# Patient Record
Sex: MALE | Race: WHITE | NOT HISPANIC OR LATINO | Employment: STUDENT | URBAN - METROPOLITAN AREA
[De-identification: names, ages, dates, MRNs, and addresses within clinical notes are randomized per-mention and may not be internally consistent; named-entity substitution may affect disease eponyms.]

---

## 2017-01-16 ENCOUNTER — OFFICE VISIT (OUTPATIENT)
Dept: INTERNAL MEDICINE | Facility: CLINIC | Age: 22
End: 2017-01-16
Payer: COMMERCIAL

## 2017-01-16 VITALS
WEIGHT: 196 LBS | HEART RATE: 96 BPM | SYSTOLIC BLOOD PRESSURE: 118 MMHG | TEMPERATURE: 97.9 F | BODY MASS INDEX: 28.12 KG/M2 | DIASTOLIC BLOOD PRESSURE: 68 MMHG | OXYGEN SATURATION: 97 %

## 2017-01-16 DIAGNOSIS — K21.9 GASTROESOPHAGEAL REFLUX DISEASE WITHOUT ESOPHAGITIS: ICD-10-CM

## 2017-01-16 DIAGNOSIS — F33.1 MODERATE EPISODE OF RECURRENT MAJOR DEPRESSIVE DISORDER (H): Primary | ICD-10-CM

## 2017-01-16 PROCEDURE — 99213 OFFICE O/P EST LOW 20 MIN: CPT | Performed by: INTERNAL MEDICINE

## 2017-01-16 RX ORDER — SERTRALINE HYDROCHLORIDE 100 MG/1
100 TABLET, FILM COATED ORAL DAILY
Qty: 90 TABLET | Refills: 1 | Status: SHIPPED | OUTPATIENT
Start: 2017-01-16 | End: 2017-07-27

## 2017-01-16 NOTE — PROGRESS NOTES
SUBJECTIVE:                                                    Reji Powell is a 21 year old male who presents to clinic today for the following health issues:      Depression F/U:    Has h/o depression. On medical treatment, better controlled, no side effects. No depressive symptoms or suicidal ideation.  Started Zoloft 1 month ago. Has increased from 25 to 50 mg, tolerated well. Has not see psychology. His stress is less. His dad has returned from ETOH rehab home. Able to talk to him.     Has h/o GERD on Omeprazole treatment. symptoms are controlled. No nausea, vomiting, heartburns, bloating.      Problem list and histories reviewed & adjusted, as indicated.  Additional history: none    Problem list, Medication list, Allergies, and Medical/Social/Surgical histories reviewed in EPIC and updated as appropriate.    ROS:  Constitutional, HEENT, cardiovascular, pulmonary, gi and gu systems are negative, except as otherwise noted.    OBJECTIVE:                                                    /68 mmHg  Pulse 96  Temp(Src) 97.9  F (36.6  C) (Oral)  Wt 196 lb (88.905 kg)  SpO2 97%  Body mass index is 28.12 kg/(m^2).  GENERAL: healthy, alert and no distress  NECK: no adenopathy, no asymmetry, masses, or scars and thyroid normal to palpation  RESP: lungs clear to auscultation - no rales, rhonchi or wheezes  CV: regular rate and rhythm, normal S1 S2, no S3 or S4, no murmur, click or rub, no peripheral edema and peripheral pulses strong  ABDOMEN: soft, nontender, no hepatosplenomegaly, no masses and bowel sounds normal  MS: no gross musculoskeletal defects noted, no edema    Diagnostic Test Results:  none      ASSESSMENT/PLAN:                                                      Problem List Items Addressed This Visit     Gastroesophageal reflux disease without esophagitis    Moderate episode of recurrent major depressive disorder (H) - Primary    Relevant Medications    sertraline (ZOLOFT) 100 MG tablet            Will titrate Zoloft to 75 mg for 2 weeks, then to 100 and plan on 6 months treatment   Cont PPI   Psychology evaluation advised     Follow-Up:in 6 months     Sincere Larson MD  [facility]

## 2017-01-16 NOTE — MR AVS SNAPSHOT
After Visit Summary   1/16/2017    Reji oPwell    MRN: [id number]           Patient Information     Date Of Birth          1995        Visit Information        Provider Department      1/16/2017 3:40 PM Sincere Larson MD [facility]        Today's Diagnoses     Moderate episode of recurrent major depressive disorder (H)    -  1     Gastroesophageal reflux disease without esophagitis            Follow-ups after your visit        Who to contact     If you have questions or need follow up information about today's clinic visit or your schedule please contact [facility] directly at [phone].  Normal or non-critical lab and imaging results will be communicated to you by MyChart, letter or phone within 4 business days after the clinic has received the results. If you do not hear from us within 7 days, please contact the clinic through MyChart or phone. If you have a critical or abnormal lab result, we will notify you by phone as soon as possible.  Submit refill requests through [employer] or call your pharmacy and they will forward the refill request to us. Please allow 3 business days for your refill to be completed.          Additional Information About Your Visit        Care EveryWhere ID     This is your Care EveryWhere ID. This could be used by other organizations to access your [place] medical records  [id number]        Your Vitals Were     Pulse Temperature Pulse Oximetry             96 97.9  F (36.6  C) (Oral) 97%          Blood Pressure from Last 3 Encounters:   01/16/17 118/68   12/12/16 120/76    Weight from Last 3 Encounters:   01/16/17 196 lb (88.905 kg)   12/12/16 203 lb (92.08 kg)              Today, you had the following     No orders found for display         Today's Medication Changes          These changes are accurate as of: 1/16/17  4:11 PM.  If you have any questions, ask your nurse or doctor.               These medicines have changed  or have updated prescriptions.        Dose/Directions    * sertraline 50 MG tablet   Commonly known as:  ZOLOFT   This may have changed:  Another medication with the same name was added. Make sure you understand how and when to take each.   Used for:  Moderate episode of recurrent major depressive disorder (H)   Changed by:  Sincere Larson MD        Take 1/2 tablet (25 mg) for 1-2 weeks, then increase to 1 tablet orally daily   Quantity:  30 tablet   Refills:  3       * sertraline 100 MG tablet   Commonly known as:  ZOLOFT   This may have changed:  You were already taking a medication with the same name, and this prescription was added. Make sure you understand how and when to take each.   Used for:  Moderate episode of recurrent major depressive disorder (H)   Changed by:  Sincere Larson MD        Dose:  100 mg   Take 1 tablet (100 mg) by mouth daily   Quantity:  90 tablet   Refills:  1       * Notice:  This list has 2 medication(s) that are the same as other medications prescribed for you. Read the directions carefully, and ask your doctor or other care provider to review them with you.         Where to get your medicines      These medications were sent to [facility]/pharmacy #[id number] - [place], [place] - [address]  [facility]  [address], [place] [place] [address]     Phone:  [phone]    - sertraline 100 MG tablet             Primary Care Provider    None Specified       No primary provider on file.        Thank you!     Thank you for choosing [facility]  for your care. Our goal is always to provide you with excellent care. Hearing back from our patients is one way we can continue to improve our services. Please take a few minutes to complete the written survey that you may receive in the mail after your visit with us. Thank you!             Your Updated Medication List - Protect others around you: Learn how to safely use, store and throw away your medicines at www.disposemymeds.org.           This list is accurate as of: 1/16/17  4:11 PM.  Always use your most recent med list.                   Brand Name Dispense Instructions for use    omeprazole 40 MG capsule    priLOSEC    30 capsule    Take 1 capsule (40 mg) by mouth daily Take 30-60 minutes before a meal.       * sertraline 50 MG tablet    ZOLOFT    30 tablet    Take 1/2 tablet (25 mg) for 1-2 weeks, then increase to 1 tablet orally daily       * sertraline 100 MG tablet    ZOLOFT    90 tablet    Take 1 tablet (100 mg) by mouth daily       ZANTAC 150 MG tablet   Generic drug:  ranitidine      Take by mouth as needed for heartburn       * Notice:  This list has 2 medication(s) that are the same as other medications prescribed for you. Read the directions carefully, and ask your doctor or other care provider to review them with you.

## 2017-01-16 NOTE — NURSING NOTE
"Chief Complaint   Patient presents with     Recheck Medication     Depression F/U ( improved)       Initial /68 mmHg  Pulse 96  Temp(Src) 97.9  F (36.6  C) (Oral)  Wt 196 lb (88.905 kg)  SpO2 97% Estimated body mass index is 28.12 kg/(m^2) as calculated from the following:    Height as of 12/12/16: 5' 10\" (1.778 m).    Weight as of this encounter: 196 lb (88.905 kg).  BP completed using cuff size: nicole Justin MA      "

## 2017-01-17 ASSESSMENT — PATIENT HEALTH QUESTIONNAIRE - PHQ9: SUM OF ALL RESPONSES TO PHQ QUESTIONS 1-9: 12

## 2017-03-08 DIAGNOSIS — K21.9 GASTROESOPHAGEAL REFLUX DISEASE WITHOUT ESOPHAGITIS: ICD-10-CM

## 2017-03-08 NOTE — TELEPHONE ENCOUNTER
omeprazole      Last Written Prescription Date: 12/12/16  Last Fill Quantity: 30,  # refills: 1   Last Office Visit with G, P or [facility] prescribing provider: 1/16/17

## 2017-03-13 RX ORDER — OMEPRAZOLE 40 MG/1
40 CAPSULE, DELAYED RELEASE ORAL DAILY
Qty: 90 CAPSULE | Refills: 2 | Status: SHIPPED | OUTPATIENT
Start: 2017-03-13 | End: 2022-04-14

## 2017-05-09 ENCOUNTER — TELEPHONE (OUTPATIENT)
Dept: INTERNAL MEDICINE | Facility: CLINIC | Age: 22
End: 2017-05-09

## 2017-05-10 ASSESSMENT — PATIENT HEALTH QUESTIONNAIRE - PHQ9: SUM OF ALL RESPONSES TO PHQ QUESTIONS 1-9: 12

## 2017-07-27 DIAGNOSIS — F33.1 MODERATE EPISODE OF RECURRENT MAJOR DEPRESSIVE DISORDER (H): ICD-10-CM

## 2017-07-28 NOTE — TELEPHONE ENCOUNTER
Sertraline     Last Written Prescription Date: 1/16/17  Last Fill Quantity: 90, # refills: 1  Last Office Visit with [facility] primary care provider:  1/16/17        Last PHQ-9 score on record=   PHQ-9 SCORE 5/9/2017   Total Score 12

## 2017-07-31 RX ORDER — SERTRALINE HYDROCHLORIDE 100 MG/1
TABLET, FILM COATED ORAL
Qty: 30 TABLET | Refills: 0 | Status: SHIPPED | OUTPATIENT
Start: 2017-07-31 | End: 2017-08-28

## 2017-08-28 DIAGNOSIS — F33.1 MODERATE EPISODE OF RECURRENT MAJOR DEPRESSIVE DISORDER (H): ICD-10-CM

## 2017-08-28 RX ORDER — SERTRALINE HYDROCHLORIDE 100 MG/1
100 TABLET, FILM COATED ORAL DAILY
Qty: 30 TABLET | Refills: 0 | Status: SHIPPED | OUTPATIENT
Start: 2017-08-28 | End: 2022-04-14

## 2017-08-28 NOTE — TELEPHONE ENCOUNTER
SERTRALINE     Last Written Prescription Date: 07/31/17  Last Fill Quantity: 30, # refills: 0  Last Office Visit with [facility] primary care provider:  01/16/17        Last PHQ-9 score on record=   PHQ-9 SCORE 5/9/2017   Total Score 12

## 2020-03-11 ENCOUNTER — COMMUNICATION - HEALTHEAST (OUTPATIENT)
Dept: TELEHEALTH | Facility: CLINIC | Age: 25
End: 2020-03-11

## 2020-03-11 ENCOUNTER — OFFICE VISIT - HEALTHEAST (OUTPATIENT)
Dept: FAMILY MEDICINE | Facility: CLINIC | Age: 25
End: 2020-03-11

## 2020-03-11 DIAGNOSIS — J45.20 MILD INTERMITTENT ASTHMA WITHOUT COMPLICATION: ICD-10-CM

## 2020-03-11 DIAGNOSIS — F33.1 MODERATE EPISODE OF RECURRENT MAJOR DEPRESSIVE DISORDER (H): ICD-10-CM

## 2020-03-11 DIAGNOSIS — Z00.00 ENCOUNTER FOR GENERAL ADULT MEDICAL EXAMINATION WITHOUT ABNORMAL FINDINGS: ICD-10-CM

## 2020-03-11 DIAGNOSIS — R14.2 BURPING: ICD-10-CM

## 2020-03-11 DIAGNOSIS — Z13.220 ENCOUNTER FOR SCREENING FOR LIPOID DISORDERS: ICD-10-CM

## 2020-03-11 DIAGNOSIS — Z76.89 ENCOUNTER TO ESTABLISH CARE: ICD-10-CM

## 2020-03-11 LAB
ANION GAP SERPL CALCULATED.3IONS-SCNC: 9 MMOL/L (ref 5–18)
BUN SERPL-MCNC: 13 MG/DL (ref 8–22)
CALCIUM SERPL-MCNC: 10 MG/DL (ref 8.5–10.5)
CHLORIDE BLD-SCNC: 105 MMOL/L (ref 98–107)
CHOLEST SERPL-MCNC: 255 MG/DL
CO2 SERPL-SCNC: 26 MMOL/L (ref 22–31)
CREAT SERPL-MCNC: 0.91 MG/DL (ref 0.7–1.3)
FASTING STATUS PATIENT QL REPORTED: YES
GFR SERPL CREATININE-BSD FRML MDRD: >60 ML/MIN/1.73M2
GLUCOSE BLD-MCNC: 90 MG/DL (ref 70–125)
HDLC SERPL-MCNC: 61 MG/DL
HGB BLD-MCNC: 17 G/DL (ref 14–18)
LDLC SERPL CALC-MCNC: 169 MG/DL
POTASSIUM BLD-SCNC: 4.4 MMOL/L (ref 3.5–5)
SODIUM SERPL-SCNC: 140 MMOL/L (ref 136–145)
TRIGL SERPL-MCNC: 125 MG/DL
TSH SERPL DL<=0.005 MIU/L-ACNC: 0.67 UIU/ML (ref 0.3–5)

## 2020-03-11 ASSESSMENT — ANXIETY QUESTIONNAIRES
7. FEELING AFRAID AS IF SOMETHING AWFUL MIGHT HAPPEN: MORE THAN HALF THE DAYS
4. TROUBLE RELAXING: MORE THAN HALF THE DAYS
IF YOU CHECKED OFF ANY PROBLEMS ON THIS QUESTIONNAIRE, HOW DIFFICULT HAVE THESE PROBLEMS MADE IT FOR YOU TO DO YOUR WORK, TAKE CARE OF THINGS AT HOME, OR GET ALONG WITH OTHER PEOPLE: SOMEWHAT DIFFICULT
5. BEING SO RESTLESS THAT IT IS HARD TO SIT STILL: NEARLY EVERY DAY
3. WORRYING TOO MUCH ABOUT DIFFERENT THINGS: MORE THAN HALF THE DAYS
6. BECOMING EASILY ANNOYED OR IRRITABLE: NEARLY EVERY DAY
GAD7 TOTAL SCORE: 16
1. FEELING NERVOUS, ANXIOUS, OR ON EDGE: SEVERAL DAYS
2. NOT BEING ABLE TO STOP OR CONTROL WORRYING: NEARLY EVERY DAY

## 2020-03-11 ASSESSMENT — PATIENT HEALTH QUESTIONNAIRE - PHQ9: SUM OF ALL RESPONSES TO PHQ QUESTIONS 1-9: 18

## 2020-03-11 ASSESSMENT — MIFFLIN-ST. JEOR: SCORE: 2009.58

## 2020-03-12 ENCOUNTER — COMMUNICATION - HEALTHEAST (OUTPATIENT)
Dept: FAMILY MEDICINE | Facility: CLINIC | Age: 25
End: 2020-03-12

## 2020-04-08 ENCOUNTER — OFFICE VISIT - HEALTHEAST (OUTPATIENT)
Dept: FAMILY MEDICINE | Facility: CLINIC | Age: 25
End: 2020-04-08

## 2020-04-08 DIAGNOSIS — F33.1 MODERATE EPISODE OF RECURRENT MAJOR DEPRESSIVE DISORDER (H): ICD-10-CM

## 2020-04-08 ASSESSMENT — PATIENT HEALTH QUESTIONNAIRE - PHQ9: SUM OF ALL RESPONSES TO PHQ QUESTIONS 1-9: 12

## 2020-04-10 ENCOUNTER — COMMUNICATION - HEALTHEAST (OUTPATIENT)
Dept: BEHAVIORAL HEALTH | Facility: CLINIC | Age: 25
End: 2020-04-10

## 2020-04-10 ENCOUNTER — OFFICE VISIT - HEALTHEAST (OUTPATIENT)
Dept: BEHAVIORAL HEALTH | Facility: CLINIC | Age: 25
End: 2020-04-10

## 2020-04-10 DIAGNOSIS — F33.1 MAJOR DEPRESSIVE DISORDER, RECURRENT, MODERATE (H): ICD-10-CM

## 2020-04-10 DIAGNOSIS — F41.1 GAD (GENERALIZED ANXIETY DISORDER): ICD-10-CM

## 2020-04-13 ASSESSMENT — ANXIETY QUESTIONNAIRES
3. WORRYING TOO MUCH ABOUT DIFFERENT THINGS: MORE THAN HALF THE DAYS
4. TROUBLE RELAXING: NEARLY EVERY DAY
1. FEELING NERVOUS, ANXIOUS, OR ON EDGE: NEARLY EVERY DAY
6. BECOMING EASILY ANNOYED OR IRRITABLE: NEARLY EVERY DAY
7. FEELING AFRAID AS IF SOMETHING AWFUL MIGHT HAPPEN: MORE THAN HALF THE DAYS
GAD7 TOTAL SCORE: 18
5. BEING SO RESTLESS THAT IT IS HARD TO SIT STILL: NEARLY EVERY DAY
2. NOT BEING ABLE TO STOP OR CONTROL WORRYING: MORE THAN HALF THE DAYS

## 2020-04-13 ASSESSMENT — PATIENT HEALTH QUESTIONNAIRE - PHQ9: SUM OF ALL RESPONSES TO PHQ QUESTIONS 1-9: 14

## 2020-04-16 ENCOUNTER — OFFICE VISIT - HEALTHEAST (OUTPATIENT)
Dept: BEHAVIORAL HEALTH | Facility: CLINIC | Age: 25
End: 2020-04-16

## 2020-04-16 DIAGNOSIS — F33.1 MAJOR DEPRESSIVE DISORDER, RECURRENT, MODERATE (H): ICD-10-CM

## 2020-04-16 DIAGNOSIS — F41.1 GAD (GENERALIZED ANXIETY DISORDER): ICD-10-CM

## 2020-04-23 ENCOUNTER — AMBULATORY - HEALTHEAST (OUTPATIENT)
Dept: BEHAVIORAL HEALTH | Facility: CLINIC | Age: 25
End: 2020-04-23

## 2020-04-23 ENCOUNTER — OFFICE VISIT - HEALTHEAST (OUTPATIENT)
Dept: BEHAVIORAL HEALTH | Facility: CLINIC | Age: 25
End: 2020-04-23

## 2020-04-23 DIAGNOSIS — F41.1 GAD (GENERALIZED ANXIETY DISORDER): ICD-10-CM

## 2020-04-23 DIAGNOSIS — F33.1 MAJOR DEPRESSIVE DISORDER, RECURRENT, MODERATE (H): ICD-10-CM

## 2020-04-30 ENCOUNTER — OFFICE VISIT - HEALTHEAST (OUTPATIENT)
Dept: BEHAVIORAL HEALTH | Facility: CLINIC | Age: 25
End: 2020-04-30

## 2020-04-30 DIAGNOSIS — F41.1 GAD (GENERALIZED ANXIETY DISORDER): ICD-10-CM

## 2020-04-30 DIAGNOSIS — F33.1 MAJOR DEPRESSIVE DISORDER, RECURRENT, MODERATE (H): ICD-10-CM

## 2020-05-07 ENCOUNTER — OFFICE VISIT - HEALTHEAST (OUTPATIENT)
Dept: BEHAVIORAL HEALTH | Facility: CLINIC | Age: 25
End: 2020-05-07

## 2020-05-07 DIAGNOSIS — F33.1 MAJOR DEPRESSIVE DISORDER, RECURRENT, MODERATE (H): ICD-10-CM

## 2020-05-07 DIAGNOSIS — F41.1 GAD (GENERALIZED ANXIETY DISORDER): ICD-10-CM

## 2020-05-19 ENCOUNTER — OFFICE VISIT - HEALTHEAST (OUTPATIENT)
Dept: BEHAVIORAL HEALTH | Facility: CLINIC | Age: 25
End: 2020-05-19

## 2020-05-19 DIAGNOSIS — F41.1 GAD (GENERALIZED ANXIETY DISORDER): ICD-10-CM

## 2020-05-19 DIAGNOSIS — F33.1 MAJOR DEPRESSIVE DISORDER, RECURRENT, MODERATE (H): ICD-10-CM

## 2020-05-26 ENCOUNTER — OFFICE VISIT - HEALTHEAST (OUTPATIENT)
Dept: BEHAVIORAL HEALTH | Facility: CLINIC | Age: 25
End: 2020-05-26

## 2020-05-26 DIAGNOSIS — F33.1 MAJOR DEPRESSIVE DISORDER, RECURRENT, MODERATE (H): ICD-10-CM

## 2020-05-26 DIAGNOSIS — F41.1 GAD (GENERALIZED ANXIETY DISORDER): ICD-10-CM

## 2020-06-09 ENCOUNTER — OFFICE VISIT - HEALTHEAST (OUTPATIENT)
Dept: BEHAVIORAL HEALTH | Facility: CLINIC | Age: 25
End: 2020-06-09

## 2020-06-09 DIAGNOSIS — F33.1 MAJOR DEPRESSIVE DISORDER, RECURRENT, MODERATE (H): ICD-10-CM

## 2020-06-09 DIAGNOSIS — F41.1 GAD (GENERALIZED ANXIETY DISORDER): ICD-10-CM

## 2020-06-15 ENCOUNTER — COMMUNICATION - HEALTHEAST (OUTPATIENT)
Dept: FAMILY MEDICINE | Facility: CLINIC | Age: 25
End: 2020-06-15

## 2020-06-15 DIAGNOSIS — F33.1 MODERATE EPISODE OF RECURRENT MAJOR DEPRESSIVE DISORDER (H): ICD-10-CM

## 2020-06-16 ENCOUNTER — OFFICE VISIT - HEALTHEAST (OUTPATIENT)
Dept: BEHAVIORAL HEALTH | Facility: CLINIC | Age: 25
End: 2020-06-16

## 2020-06-16 DIAGNOSIS — F33.1 MAJOR DEPRESSIVE DISORDER, RECURRENT, MODERATE (H): ICD-10-CM

## 2020-06-16 DIAGNOSIS — F41.1 GAD (GENERALIZED ANXIETY DISORDER): ICD-10-CM

## 2020-06-23 ENCOUNTER — OFFICE VISIT - HEALTHEAST (OUTPATIENT)
Dept: BEHAVIORAL HEALTH | Facility: CLINIC | Age: 25
End: 2020-06-23

## 2020-06-23 DIAGNOSIS — F33.1 MAJOR DEPRESSIVE DISORDER, RECURRENT, MODERATE (H): ICD-10-CM

## 2020-06-23 DIAGNOSIS — F41.1 GAD (GENERALIZED ANXIETY DISORDER): ICD-10-CM

## 2020-06-30 ENCOUNTER — OFFICE VISIT - HEALTHEAST (OUTPATIENT)
Dept: BEHAVIORAL HEALTH | Facility: CLINIC | Age: 25
End: 2020-06-30

## 2020-06-30 DIAGNOSIS — F41.1 GAD (GENERALIZED ANXIETY DISORDER): ICD-10-CM

## 2020-06-30 DIAGNOSIS — F33.1 MAJOR DEPRESSIVE DISORDER, RECURRENT, MODERATE (H): ICD-10-CM

## 2020-07-14 ENCOUNTER — OFFICE VISIT - HEALTHEAST (OUTPATIENT)
Dept: BEHAVIORAL HEALTH | Facility: CLINIC | Age: 25
End: 2020-07-14

## 2020-07-14 DIAGNOSIS — F33.1 MAJOR DEPRESSIVE DISORDER, RECURRENT, MODERATE (H): ICD-10-CM

## 2020-07-14 DIAGNOSIS — F41.1 GAD (GENERALIZED ANXIETY DISORDER): ICD-10-CM

## 2020-08-04 ENCOUNTER — OFFICE VISIT - HEALTHEAST (OUTPATIENT)
Dept: BEHAVIORAL HEALTH | Facility: CLINIC | Age: 25
End: 2020-08-04

## 2020-08-04 DIAGNOSIS — F33.1 MAJOR DEPRESSIVE DISORDER, RECURRENT, MODERATE (H): ICD-10-CM

## 2020-08-04 DIAGNOSIS — F41.1 GAD (GENERALIZED ANXIETY DISORDER): ICD-10-CM

## 2020-08-05 ENCOUNTER — AMBULATORY - HEALTHEAST (OUTPATIENT)
Dept: BEHAVIORAL HEALTH | Facility: CLINIC | Age: 25
End: 2020-08-05

## 2020-08-18 ENCOUNTER — OFFICE VISIT - HEALTHEAST (OUTPATIENT)
Dept: BEHAVIORAL HEALTH | Facility: CLINIC | Age: 25
End: 2020-08-18

## 2020-08-18 DIAGNOSIS — F41.1 GAD (GENERALIZED ANXIETY DISORDER): ICD-10-CM

## 2020-08-18 DIAGNOSIS — F33.1 MAJOR DEPRESSIVE DISORDER, RECURRENT, MODERATE (H): ICD-10-CM

## 2020-09-01 ENCOUNTER — OFFICE VISIT - HEALTHEAST (OUTPATIENT)
Dept: BEHAVIORAL HEALTH | Facility: CLINIC | Age: 25
End: 2020-09-01

## 2020-09-01 DIAGNOSIS — F41.1 GAD (GENERALIZED ANXIETY DISORDER): ICD-10-CM

## 2020-09-01 DIAGNOSIS — F33.1 MAJOR DEPRESSIVE DISORDER, RECURRENT, MODERATE (H): ICD-10-CM

## 2020-09-15 ENCOUNTER — OFFICE VISIT - HEALTHEAST (OUTPATIENT)
Dept: BEHAVIORAL HEALTH | Facility: CLINIC | Age: 25
End: 2020-09-15

## 2020-09-15 DIAGNOSIS — F41.1 GAD (GENERALIZED ANXIETY DISORDER): ICD-10-CM

## 2020-09-15 DIAGNOSIS — F33.1 MAJOR DEPRESSIVE DISORDER, RECURRENT, MODERATE (H): ICD-10-CM

## 2020-09-29 ENCOUNTER — OFFICE VISIT - HEALTHEAST (OUTPATIENT)
Dept: BEHAVIORAL HEALTH | Facility: CLINIC | Age: 25
End: 2020-09-29

## 2020-09-29 DIAGNOSIS — F41.1 GAD (GENERALIZED ANXIETY DISORDER): ICD-10-CM

## 2020-09-29 DIAGNOSIS — F33.1 MAJOR DEPRESSIVE DISORDER, RECURRENT, MODERATE (H): ICD-10-CM

## 2020-11-11 ENCOUNTER — OFFICE VISIT - HEALTHEAST (OUTPATIENT)
Dept: BEHAVIORAL HEALTH | Facility: CLINIC | Age: 25
End: 2020-11-11

## 2020-11-11 DIAGNOSIS — F41.1 GAD (GENERALIZED ANXIETY DISORDER): ICD-10-CM

## 2020-11-11 DIAGNOSIS — F33.1 MAJOR DEPRESSIVE DISORDER, RECURRENT, MODERATE (H): ICD-10-CM

## 2020-11-12 ENCOUNTER — AMBULATORY - HEALTHEAST (OUTPATIENT)
Dept: BEHAVIORAL HEALTH | Facility: CLINIC | Age: 25
End: 2020-11-12

## 2021-01-25 ENCOUNTER — COMMUNICATION - HEALTHEAST (OUTPATIENT)
Dept: ADMINISTRATIVE | Facility: CLINIC | Age: 26
End: 2021-01-25

## 2021-01-25 DIAGNOSIS — F33.1 MODERATE EPISODE OF RECURRENT MAJOR DEPRESSIVE DISORDER (H): ICD-10-CM

## 2021-03-18 ENCOUNTER — COMMUNICATION - HEALTHEAST (OUTPATIENT)
Dept: FAMILY MEDICINE | Facility: CLINIC | Age: 26
End: 2021-03-18

## 2021-03-18 DIAGNOSIS — F33.1 MODERATE EPISODE OF RECURRENT MAJOR DEPRESSIVE DISORDER (H): ICD-10-CM

## 2021-03-22 ENCOUNTER — COMMUNICATION - HEALTHEAST (OUTPATIENT)
Dept: FAMILY MEDICINE | Facility: CLINIC | Age: 26
End: 2021-03-22

## 2021-03-22 DIAGNOSIS — F33.1 MODERATE EPISODE OF RECURRENT MAJOR DEPRESSIVE DISORDER (H): ICD-10-CM

## 2021-04-12 ENCOUNTER — OFFICE VISIT - HEALTHEAST (OUTPATIENT)
Dept: FAMILY MEDICINE | Facility: CLINIC | Age: 26
End: 2021-04-12

## 2021-04-12 DIAGNOSIS — F33.1 MODERATE EPISODE OF RECURRENT MAJOR DEPRESSIVE DISORDER (H): ICD-10-CM

## 2021-04-12 ASSESSMENT — ANXIETY QUESTIONNAIRES
7. FEELING AFRAID AS IF SOMETHING AWFUL MIGHT HAPPEN: SEVERAL DAYS
GAD7 TOTAL SCORE: 11
4. TROUBLE RELAXING: NEARLY EVERY DAY
6. BECOMING EASILY ANNOYED OR IRRITABLE: NEARLY EVERY DAY
2. NOT BEING ABLE TO STOP OR CONTROL WORRYING: SEVERAL DAYS
IF YOU CHECKED OFF ANY PROBLEMS ON THIS QUESTIONNAIRE, HOW DIFFICULT HAVE THESE PROBLEMS MADE IT FOR YOU TO DO YOUR WORK, TAKE CARE OF THINGS AT HOME, OR GET ALONG WITH OTHER PEOPLE: SOMEWHAT DIFFICULT
1. FEELING NERVOUS, ANXIOUS, OR ON EDGE: SEVERAL DAYS
3. WORRYING TOO MUCH ABOUT DIFFERENT THINGS: SEVERAL DAYS
5. BEING SO RESTLESS THAT IT IS HARD TO SIT STILL: SEVERAL DAYS

## 2021-04-12 ASSESSMENT — PATIENT HEALTH QUESTIONNAIRE - PHQ9: SUM OF ALL RESPONSES TO PHQ QUESTIONS 1-9: 8

## 2021-05-04 ENCOUNTER — AMBULATORY - HEALTHEAST (OUTPATIENT)
Dept: NURSING | Facility: CLINIC | Age: 26
End: 2021-05-04

## 2021-05-25 ENCOUNTER — AMBULATORY - HEALTHEAST (OUTPATIENT)
Dept: NURSING | Facility: CLINIC | Age: 26
End: 2021-05-25

## 2021-05-26 ASSESSMENT — PATIENT HEALTH QUESTIONNAIRE - PHQ9
SUM OF ALL RESPONSES TO PHQ QUESTIONS 1-9: 14
SUM OF ALL RESPONSES TO PHQ QUESTIONS 1-9: 12

## 2021-05-27 ASSESSMENT — PATIENT HEALTH QUESTIONNAIRE - PHQ9
SUM OF ALL RESPONSES TO PHQ QUESTIONS 1-9: 18
SUM OF ALL RESPONSES TO PHQ QUESTIONS 1-9: 8

## 2021-05-28 ASSESSMENT — ASTHMA QUESTIONNAIRES: ACT_TOTALSCORE: 25

## 2021-05-28 ASSESSMENT — ANXIETY QUESTIONNAIRES
GAD7 TOTAL SCORE: 18
GAD7 TOTAL SCORE: 11
GAD7 TOTAL SCORE: 16

## 2021-06-04 VITALS
BODY MASS INDEX: 32.14 KG/M2 | HEART RATE: 78 BPM | DIASTOLIC BLOOD PRESSURE: 76 MMHG | OXYGEN SATURATION: 96 % | SYSTOLIC BLOOD PRESSURE: 131 MMHG | WEIGHT: 224.5 LBS | HEIGHT: 70 IN

## 2021-06-07 NOTE — PROGRESS NOTES
MENTAL HEALTH VISIT NOTE    04/22/2020  Start time: 801    Stop Time: 852   Session # 2    Session Type: Patient is presenting for an Individual session.    Reji Powell is a 24 y.o. male is being seen today for    Chief Complaint   Patient presents with      Follow Up     Video Visit Mental Health   .     Telemedicine Visit: The patient's condition can be safely assessed and treated via synchronous audio and visual telemedicine encounter.      Reason for Telemedicine Visit: Services only offered telehealth    Originating Site (Patient Location): Patient's vehicle outside of home    Distant Site (Provider Location): Provider Remote Setting    Consent:  The patient/guardian has verbally consented to: the potential risks and benefits of telemedicine (video visit) versus in person care; bill my insurance or make self-payment for services provided; and responsibility for payment of non-covered services.     Mode of Communication:  Video Conference via [employer]    As the provider I attest to compliance with applicable laws and regulations related to telemedicine.    Those present for this visit patient and therapist    Follow up in regards to ongoing symptom management of grief    New symptoms or complaints: Patient indicated still thinking daily about his dad passing away.     Functional Impairment:   Personal: 3  Family: 3  Social: 2  Work: 3    Clinical assessment of mental status:   Reji Powell presented on time.   He was oriented x3, open and cooperative, and dressed appropriately for this session and weather. His memory was Normal cognitive functioning .  His speech was  Within normal.  Language was intact.  Concentration and focus is Within normal. Psychosis is not noted or reported. He reports his mood is Depressed.  Affect is congruent with speech and is Congruent w/content of speech.  Fund of knowledge is adequate. Insight is adequate for therapy.    Suicidal/Homicidal Ideation present: Patient  denies suicidal and homicidal ideations/means or plans.     Patient's impression of their current status: Patient indicated feeling down. Patient reported he thinks about his dad everyday. Patient talked about the process he went through with his dad and his dad's drinking. Patient indicated asking his family for help with him but only getting help for a short period. Patient reported his dad was in the hospital on many occassions and did treatment at one point. Patient indicated he has guilt over not going upstairs on the Saturday before his dad passed away.     Therapist impression of patients current state: This 24 y.o. White or  male presents with depression. This therapist challenged patient on ways he tried to help his dad. This therapist processed with patient ways many situations were out of is control. This therapist processed with patient the different stages of grief.     Assessment tools used today include: None    Type of psychotherapeutic technique provided: Insight oriented, Client centered and CBT    Progress toward short term goals:Progress as expected with patient continuing therapy, recognizing his guilt over his dad and being able to identify stuck points.     Review of long term goals: Treatment Plan Developed on 4/23/2020    Diagnosis:   1. Major depressive disorder, recurrent, moderate (H)    2. AUDREY (generalized anxiety disorder)        Plan and Follow-up: Patient continuing with weekly therapy. Patient would benefit from identifying ways he continues with guilt over his father passing away. Patient work on continuing to identify the different ways he has processed and continues to process the stages of grief.    Discharge Criteria/Planning: Patient will continue with follow-up until therapy can be discontinued without return of signs and symptoms.    Performed and documented by Sandra Mccarty [facility]

## 2021-06-07 NOTE — PROGRESS NOTES
Outpatient Mental Health Treatment Plan    Name:  Reji Powell  :  1995  MRN:  [id number]    Treatment Plan:  Initial Treatment Plan  Intake/initial treatment plan date:  2020  Benefit and risks and alternatives have been discussed: YES  Is this treatment appropriate with minimal intrusion/restrictions: YES   Estimated duration of treatment:  Ongoing therapy at this time  Anticipated frequency of services:  Weekly  Necessity for frequency: This frequency is needed to establish therapeutic goals and for continuity of care in order to monitor progress.  Necessity for treatment: To address cognitive, behavioral, and/or emotional barriers in order to work toward goals and to improve quality of life.    Session Type: Patient is presenting for an Individual session.    Plan:           ?   ? Anxiety    Goal:  Decrease average anxiety level from 3 to 1.   Strategies: ? [x]Learn and practice relaxation techniques and other coping        strategies (e.g., thought stopping, reframing, meditation)     ? [x] Increase involvement in meaningful activities     ? [x] Discuss sleep hygiene     ? [x] Explore thoughts and expectations about self and others     ? [x] Identify and monitor triggers for panic/anxiety symptoms     ? [x] Implement physical activity routine (with physician approval)     ? [x] Consider introduction of bibliotherapy and/or videos     ? [x] Continue compliance with medical treatment plan (or explore  barriers)                                       []     ?Degree to which this is a problem: 3  Degree to which goal is met: 1    Date of next review: 2020       ? Depression    Goal:  Decrease average depression level from 4 to 1.   Strategies:    ?[x] Decrease social isolation     [x] Increase involvement in meaningful activities     ?[x] Discuss sleep hygiene     ?[x] Explore thoughts and expectations about self and others     ?[x] Process grief (loss of significant person, independence, role,         etc.)     ?[x] Assess for suicide risk     ?[x] Implement physical activity routine (with physician approval)     [x] Consider introduction of bibliotherapy and/or videos     [x] Continue compliance with medical treatment plan (or explore         barriers)       ?  Degree to which this is a problem: 4  Degree to which goal is met: 1    Date of next review: 07/23/2020     Functional Impairment:  1=Not at all/Rarely  2=Some days  3=Most Days  4=Every Day    Personal : 3  Family : 3  Social : 2  Work/school : 2    Diagnosis:   Major depressive disorder, recurrent, moderate; Generalized Anxiety disorder;       Clinical assessments and measures completed:. AUDREY-7 and PHQ-9 C-SSR     Strengths:  Hard working  Limitations: Confrontation   Cultural Considerations: Patient is a 24 year old single white male.     Persons responsible for this plan: Patient and Provider            Psychotherapist Signature           Patient Signature:              Guardian Signature             Provider: Performed and documented by DENICE Escoto   Date:  4/23/2020

## 2021-06-07 NOTE — PROGRESS NOTES
MENTAL HEALTH VISIT NOTE  04/10/2020    Start time: 902    Stop Time: 950   Session # 1    Session Type: Patient is presenting for an Individual session.    Reji Powell is a 24 y.o. male is being seen today for    Chief Complaint   Patient presents with     Intake     Video Visit Mental Health   .     Telemedicine Visit: The patient's condition can be safely assessed and treated via synchronous audio and visual telemedicine encounter.      Reason for Telemedicine Visit: Services only offered telehealth    Originating Site (Patient Location): Patient's home    Distant Site (Provider Location): Provider Remote Setting    Consent:  The patient/guardian has verbally consented to: the potential risks and benefits of telemedicine (video visit) versus in person care; bill my insurance or make self-payment for services provided; and responsibility for payment of non-covered services.     Mode of Communication:  Video Conference via [facility]    As the provider I attest to compliance with applicable laws and regulations related to telemedicine.    Those present for this visit patient and therapist     Follow up in regards to ongoing symptom management of depression and anxiety    New symptoms or complaints: Patient reported struggling with depression and anxiety.     Functional Impairment:   Personal: 3  Family: 3  Social: 3  Work: 1    Clinical assessment of mental status:   Reji Powell presented on time.   He was oriented x3, open and cooperative, and dressed appropriately for this session and weather. His memory was Normal cognitive functioning .  His speech was  Within normal.  Language was intact.  Concentration and focus is Within normal. Psychosis is not noted or reported. He reports his mood is Depressed.  Affect is congruent with speech and is Congruent w/content of speech.  Fund of knowledge is adequate. Insight is adequate for therapy.    Suicidal/Homicidal Ideation present: Patient denies suicidal  and homicidal ideations/means or plans.     Patient's impression of their current status: Patient presented via video visit for intake. This therapist verbally went over informed consent and patient verbally agreed. Patient indicated wanting to start therapy to work on his depression and anxiety. Patient reported his parents got  his first year of college when he was 20 years old. Patient indicated his dad started drinking heavy and  2 years ago from cirrhosis of the liver. Patient reported since his parents divorce he has struggled more with being depressed, irritable, on edge and no interest in hanging with friends or doing social activities.     Therapist impression of patients current status: The patient presented for an initial intake session with this provider. Patient is a 24 year old male. Patient was referred by PEYTON Gordon to engage in individual psychotherapy to address symptoms of depression and anxiety. The patient appeared cooperative and open-minded throughout the intake and easily engaged in dialogue. Therapist and patient reviewed consent and privacy policy. Patient reported understanding and verbally agreed due to session being video due to pandemic. . The patient has not engaged in outpatient psychotherapy services in the community so there is no diagnostic assessment on file or available. The patient completed the following questionnaires for session today:  C-SSRS. These questionnaires will be used to inform diagnoses and will be uploaded to patient chart after standard DA is completed* No concerns about patient's safety or the safety of others per assessment today.  Therapist and patient will further review patient's history during the next follow-up encounter in order to complete a standard diagnostic assessment. Goals for treatment will be established after the 2nd or 3rd follow-up session with this provider. The patient plans to follow-up with psychotropic medication  management with her PCP. Patient did not request psychiatry services at intake.    Assessment tools used today include: C-SSR    Type of psychotherapeutic technique provided: Insight oriented, Client centered and CBT    Progress toward short term goals:Progress as expected, patient starting therapy    Review of long term goals: Treatment Plan will be developed when DA is complete.    Diagnosis:   1. Major depressive disorder, recurrent, moderate (H)    2. AUDREY (generalized anxiety disorder)      Plan and Follow-up: Patient will start with weekly therapy sessions. Patient should complete the PHQ-9 and AUDREY-7 sent via [employer]. Patient would benefit from identifying patterns or triggers related to his mental health.     Discharge Criteria/Planning: Patient will continue with follow-up until therapy can be discontinued without return of signs and symptoms.    Performed and documented by 4/10/2020

## 2021-06-07 NOTE — PROGRESS NOTES
OP MENTAL HEALTH PSYCHOTHERAPY   Standard Diagnostic Assessment    PATIENT'S NAME:    Reji Powell  MRN:                          [id number]  :                          1995  ACCT. NUMBER:      [id number]  DATE OF SERVICE: 2020  START TIME: 701  END TIME: 745  VIDEO VISIT: Yes    Telemedicine Visit: The patient's condition can be safely assessed and treated via synchronous audio and visual telemedicine encounter.       Reason for Telemedicine Visit: Services only offered telehealth     Originating Site (Patient Location): Patient's vehicle outside of home     Distant Site (Provider Location): Provider Remote Setting     Consent:  The patient/guardian has verbally consented to: the potential risks and benefits of telemedicine (video visit) versus in person care; bill my insurance or make self-payment for services provided; and responsibility for payment of non-covered services.      Mode of Communication:  Video Conference via [employer]     As the provider I attest to compliance with applicable laws and regulations related to telemedicine.     Those present for this visit patient and therapist     Identifying Information:  Patient is a 24 y.o., .  Patient's preferred name is Reji and  uses the pronoun he/him; the pronoun use throughout this assessment reflects the sex of the patient male at birth. Patient was referred for an assessment by  [facility]. Patient attended the session alone.    The patient describes their cultural background as hard work being important. Patient indicated his dad taught him the importance of an hard work and work ethic. Patient reported his family was distant from one another.   Cultural influences and impact  on patient's life structure, values, norms, and healthcare: Western medicine. The patient reports there  independence   Patient identified his preferred language to be English. Patient reported he does not need the assistance  of an  or other support involved in therapy. Modifications will not be used to assist communication in therapy.      Chief Complaint:  The reason for seeking services at this time is due to his depression and anxiety symptoms.      Presenting Concern:  Patient reports current  issues and concerns include grief, no motivation and has impacted social activities such as not wanting to do anything.      Patient indicated his parents  when he was in his first semester age college. Patient reported his dad not handling the divorce well and started drinking heavy. Patient indicated before he would have described his dad as an functioning alcoholic. Patient indicated he dropped out of college and went to live with his dad. Patient reported his dad  2 years ago from cirrhosis  of the liver due to his drinking. Patient indicated when his parents first got  he did not have a good relationship with his mom. Patient reported now having an okay relationship with his mom. Patient indicated he has accepted that his dad is gone but still becomes emotional at times. Patient reported he continues to think about his dad daily.     History of Presenting Concern:  The problem(s) began at age 20. Patient has not attempted to resolve these concerns in the past. Patient reports that other professional(s) are involved in providing support / services. [facility].    Social/Family History:    Patient reported he grew up in [facility].  They were raised by  biological parents. They were the second born of 2 children. Parents  4 years ago when the patient was 20 years old. Patient reported that his childhood was good with all his needs met.  Patient described his current relationships with family of origin as an okay relationship with his mom and a great relationship with his sister.    Patient reported the following relationship history including one in high school that he had a hard  time getting over.  Patient's current relationship status is in a relationship for 5 years.   Patient identified their sexual orientation as heterosexual.  Patient reported having no children.     Patient's current living/housing situation involves renting an apartment with a pkan to rent a townhome. .  Patient identified friends and girlfriend   as part of their support system.  Patient identified the quality of these relationships as good     Patient is currently on furlough due to the pandemic but works full time as a property maintenance.  Patient reports their finances are obtained through employment .  Patient does not dentify finances as a current stressor.  Patient reported that he has not been involved with the legal system.   Patient does not have a history of sexual offenses. Patient does not have a current .  Contextual factors outside of patient's control contributing to presenting concerns:     Education and developmental history  Patient's highest education level was some college. He reports experiencing no significant development issues in childhood Patient did not identify any learning problems..  Patient did not serve in the .       Medical Issues:  Patient reports the following family medical history:   Family History   Problem Relation Age of Onset     Alcohol abuse Father        Patient has had a physical exam to rule out medical causes for current symptoms.  Date of last physical exam was within the past year. Patient was encouraged to follow up with PCP if symptoms were to develop. The patient has a Primary Care Provider within this system. PCP is Gogo Ervin.  Patient denied current medical concerns. They did not report dental concerns.  There are not significant nutritional concerns / weight changes.  The patient has not been diagnosed with an eating disorder.The patient denied the presence of chronic or episodic pain.    Patient reports current meds as:    Current Outpatient Medications   Medication Sig Dispense Refill     baclofen (LIORESAL) 10 MG tablet Take 1 tablet (10 mg total) by mouth 3 (three) times a day. 30 tablet 1     buPROPion (WELLBUTRIN XL) 300 MG 24 hr tablet Take 1 tablet (300 mg total) by mouth every morning. 30 tablet 2     sertraline (ZOLOFT) 100 MG tablet Take 1 tablet (100 mg total) by mouth daily. 30 tablet 11     No current facility-administered medications for this visit.        Patient Allergies:  Allergies   Allergen Reactions     Cefixime Unknown       Medical History:  Past Medical History:   Diagnosis Date     Anxiety      Asthma      Burping      Depression        Medication Adherence:  Patient reports taking prescribed medications as indicated    Mental Health History:  Patient reported the following biological family members or relatives with mental health issues: father experienced depression and mother experienced anxiety.  Patient previously received the following mental health diagnosis: depression.. Patient reported symptoms began at age 20.   Patient has not received mental health services in the past.  Hospitalizations: None .  Patient denies a history of civil commitment .  Patientis not currently receiving any mental health services.      Current Mental Status Exam:   Appearance: well groomed   Eye Contact: good  Psychomotor: appropriate / unremarkable  Gait / station: intact  Attitude / Demeanor: cooperative  Speech   Rate / Production:  clear   Volume:  regular volume   Language: intact  Mood:  depressed  Affect:  appropriate  Thought Content: no evidence of suicidal ideation, violent ideation, or psychotic thought  Thought Process: goal directed and logical  Associations: no loose associations  Insight:  fair  Judgment: intact  Orientation: person, place, time and situation  Attention/concentration: intact  Recent memory: intact  Remote memory: intact  Fund of knowledge: appropriate    Review of Symptoms:  Depression:  Lack of interest, Excessive or inappropriate guilt, Change in energy level, Change in appetite, Ruminations, Irritability, Feeling sad, down, or depressed, Withdrawn and Anger outbursts  Vanesa:          No Symptoms  Psychosis:   No Symptoms  Anxiety:       Excessive worry, Nervousness, Irritability and racing thoughts, hard to sit still, on edge, sweaty  Panic:          No symptoms  Post Traumatic Stress Disorder:  No Symptoms  Obsessive Compulsive Disorder: No Symptoms  Eating Disorder:     No Symptoms  Oppositional Defiant Disorder:     No Symptoms  ADD / ADHD:         No symptoms  Conduct Disorder: No symptoms  Autism Spectrum Disorder:   No symptoms    Rating Scales:  PHQ9: 14  GAD7: 18                  Chemical Health History:  Patient reported the following biological family members or relatives with chemical health issues  dad alcohol abuse.   Patient has not received chemical dependency treatment in the past. Patient has not ever been to detox.       Patient reported occasional alcohol use.  Patient denies using tobacco  Patient reported daily cannabis use.  Patient reported caffeine use.   Patient denies use of cocaine/crack.  Patient denies use of meth/amphetamines  Patient denies use of heroin  Patient denies use of opiates.  Patient denies inhalant use  Patient denies use of benzodiazepines.  Patient denies using hallucinogens.  Patient denies use of barbiturates.  Patient denies use of over the counter drugs.   Patient denies use of other substances.      ADULT CD: Have you ever felt you ought to CUT down on your drinking or drug use? No  Have you ever had people ANNOY you by criticizing your drinking or drug use? No  Have you ever felt GUILTY about your drinking or drug use? No  Have you ever had a drink or used drugs as an EYE OPENER first thing in the morning to steady your nerves, or get rid of a hangover or to get the day started? No       Based on the negative CAGE score and clinical interview  there  are not indications of drug or alcohol abuse..    Patient does not report concerns about gambling and has not ever had treatment for gambling.    Patient does not have other compulsive behaviors he is concerned about.    Significant Losses / Trauma / Abuse / Neglect Issues:  indications or report of significant loss or trauma related to death offather.    Concerns for possible neglect are not present.     Safety Assessment:  Negative    Patient denied a history of homicidal ideation.    Patient denied a history of self injurious ideation and behavior  Patient denied history of personal safety concerns.  Patient denied a history of assaultive behaviors.   Patient denied history of risk behaviors. associated with substance use.    Patient denies a history of high risk behaviors. associated with mental health symptoms.  Patient denies current homicidal ideation.  Patient denied current self injurious ideation and behaviors,  Patient denied risk behaviors. associated with substance use.  Patient denies high risk behaviors. associated with mental health symptoms.  Patient denies current concerns for personal safety.  Patient reports the following protective factors: forward / future oriented thinking, safe and stable environment , living with other people, sense of meaning and positive social skills  Patient reports there are no firearms in the house.     Plan for safety and risk management:  SAFETY PLAN: Recommended that patient call 911  or go to the local ED should there be a change in any of these risk factors.  RISK INTERVENTION: A safety and risk management plan has been developed     Patient's Strengths and Limitations:  Patient identified the following strengths or resources that will help him succeed in counseling: motivation and work ethic. Patient identified the following supports:friends Things that may interfere with the patient's success in counseling include: self-esteem.      Clinical Summary:    Patient is a 24 y.o., .  Patient's preferred name is Reji and  uses the pronoun he/him; the pronoun use throughout this assessment reflects the sex of the patient male at birth. Patient was referred for an assessment by  [facility]. Patient attended the session alone.    The patient describes their cultural background as hard work being important. Patient indicated his dad taught him the importance of an hard work and work ethic. Patient reported his family was distant from one another.   Cultural influences and impact  on patient's life structure, values, norms, and healthcare: Western medicine. The patient reports there  independence   Patient identified his preferred language to be English. Patient reported he does not need the assistance of an  or other support involved in therapy. Modifications will not be used to assist communication in therapy.      The reason for seeking services at this time is due to his depression and anxiety symptoms.    Patient reports current  issues and concerns include grief, no motivation and has impacted social activities such as not wanting to do anything.      Patient indicated his parents  when he was in his first semester age college. Patient reported his dad not handling the divorce well and started drinking heavy. Patient indicated before he would have described his dad as an functioning alcoholic. Patient indicated he dropped out of college and went to live with his dad. Patient reported his dad  2 years ago from cirrhosis  of the liver due to his drinking. Patient indicated when his parents first got  he did not have a good relationship with his mom. Patient reported now having an okay relationship with his mom. Patient indicated he has accepted that his dad is gone but still becomes emotional at times. Patient reported he continues to think about his dad daily.        DSM5 Diagnoses: (Sustained by  DSM5 Criteria Listed Above)  Diagnoses:   DSM-5     It appears patient meets DSM-5 criteria for major depressive disorder, recurrent, moderate as evidenced by Lack of interest, Excessive or inappropriate guilt, Change in energy level, Change in appetite, Ruminations, Irritability, Feeling sad, down, or depressed, Withdrawn and Anger outbursts    It appears patient meets DSM-5 criteria for generalized anxiety disorder as evidenced by Excessive worry, Nervousness, Irritability and racing thoughts, hard to sit still, on edge, sweaty.    This therapist will continue to evaluate for cannabis use disorder    Collaboration:  Collaboration / coordination of treatment will be initiated with the following professionals: Collaboration / coordination with other professionals is not indicated at this time.    Client and family participation in assessment with patient not indicating family support in him seeking therapy.     Preliminary Treatment Plan:  Plan for Safety and Risk Management:  Recommended that patient call 911  or go to the local ED should there be a change in any of these risk factors.    The concerns identified by the  patient will be addressed in behavioral health services.  Recommendations  Initial Treatment will focus on: Depressed Mood - Reduce depression symptoms and Anxiety - Reduce anxiety symptoms    Referral to another professional / service is not indicated at this time.    Resources/Service Plan:                  services are not indicated..     Modifications to assist communication are not indicated..                Additional disability accomodations are not indicated.                NO, records were not available for review at time of assessment.    Report to child / adult protection services was not needed.    Information in this assessment was obtained from the medical record and provided by   patient who is a good historian.     Sandra Mccarty, [facility]                 04/16/20

## 2021-06-08 NOTE — PROGRESS NOTES
MENTAL HEALTH VISIT NOTE    06/9/2020  Start time: 405    Stop Time: 449   Session # 7    Session Type: Patient is presenting for an Individual session.    Reji Powell is a 25 y.o. male is being seen today for    Chief Complaint   Patient presents with      Follow Up     Video Visit Mental Health   .     Telemedicine Visit: The patient's condition can be safely assessed and treated via synchronous audio and visual telemedicine encounter.      Reason for Telemedicine Visit: Services only offered telehealth    Originating Site (Patient Location): Patient's  home    Distant Site (Provider Location): Provider Remote Setting    Consent:  The patient/guardian has verbally consented to: the potential risks and benefits of telemedicine (video visit) versus in person care; bill my insurance or make self-payment for services provided; and responsibility for payment of non-covered services.     Mode of Communication:  Video Conference via  [employer]     As the provider I attest to compliance with applicable laws and regulations related to telemedicine.    Those present for this visit patient and therapist    Follow up in regards to ongoing symptom management of grief and anxiety.     New symptoms or complaints: None    Functional Impairment:   Personal: 3  Family: 3  Social: 2  Work: 3    Clinical assessment of mental status:   Reji Powell presented on time.   He was oriented x3, open and cooperative, and dressed appropriately for this session and weather. His memory was Normal cognitive functioning .  His speech was  Within normal.  Language was intact.  Concentration and focus is Within normal. Psychosis is not noted or reported. He reports his mood is down.  Affect is congruent with speech and is Congruent w/content of speech.  Fund of knowledge is adequate. Insight is adequate for therapy. Reviewed and changes made as needed for 6/9/2020    Suicidal/Homicidal Ideation present: Patient denies suicidal and homicidal  ideations/means or plans.     Patient's impression of their current status: Patent indicated feeling down. Patient reported the same person at work continues to cause stress for patient. Patient indicated he wishes all the time that his dad was around so that he could to talk him. Patient reported there are so many times throughout the day he wants to reach out to his dad. Patient indicated he has never been as close with his mom and their relationship continues to be strained. Patient reported not talking with anyone about the grief.     Therapist impression of patients current state: This 25 y.o. White or  male presents with anxiety. This therapist processed with patient the importance of continuing to talk and grief his dad. This therapist processed with patient his relationship with his mom and how this continues to affect him today.    Assessment tools used today include: None    Type of psychotherapeutic technique provided: Insight oriented, Client centered and CBT    Progress toward short term goals:Progress as expected with patient continuing therapy, using skills to reduce symptoms, noticing stages of grief and noticing changes in his mood.     Review of long term goals: Treatment Plan Developed on 4/23/2020    Diagnosis:   1. Major depressive disorder, recurrent, moderate (H)    2. AUDREY (generalized anxiety disorder)        Plan and Follow-up: Patient continuing with weekly therapy. Patient would benefit from identifying ways he continues with guilt over his father passing away. Patient work on continuing to identify the different ways he has processed and continues to process the stages of grief.Patient should work on identifying his continued struggles with his relationship with his mom. Patient would benefit from working on staying in the moment and not jumping to conclusions.     Discharge Criteria/Planning: Patient will continue with follow-up until therapy can be discontinued without return  of signs and symptoms.    Performed and documented by DENICE Escoto

## 2021-06-08 NOTE — PROGRESS NOTES
MENTAL HEALTH VISIT NOTE    05/7/2020  Start time: 920    Stop Time: 1000   Session # 4    Session Type: Patient is presenting for an Individual session.    Reji Powell is a 25 y.o. male is being seen today for    Chief Complaint   Patient presents with      Follow Up     Video Visit Mental Health   .     Telemedicine Visit: The patient's condition can be safely assessed and treated via synchronous audio and visual telemedicine encounter.      Reason for Telemedicine Visit: Services only offered telehealth    Originating Site (Patient Location): Patient's vehicle outside of home    Distant Site (Provider Location): Provider Remote Setting    Consent:  The patient/guardian has verbally consented to: the potential risks and benefits of telemedicine (video visit) versus in person care; bill my insurance or make self-payment for services provided; and responsibility for payment of non-covered services.     Mode of Communication:  Video Conference via [employer]    As the provider I attest to compliance with applicable laws and regulations related to telemedicine.    Those present for this visit patient and therapist    Follow up in regards to ongoing symptom management of grief and anxiety.     New symptoms or complaints: None    Functional Impairment:   Personal: 3  Family: 3  Social: 2  Work: 3    Clinical assessment of mental status:   Reji Powell presented on time.   He was oriented x3, open and cooperative, and dressed appropriately for this session and weather. His memory was Normal cognitive functioning .  His speech was  Within normal.  Language was intact.  Concentration and focus is Within normal. Psychosis is not noted or reported. He reports his mood is down.  Affect is congruent with speech and is Congruent w/content of speech.  Fund of knowledge is adequate. Insight is adequate for therapy. Reviewed and changes made as needed.    Suicidal/Homicidal Ideation present: Patient denies suicidal  and homicidal ideations/means or plans.     Patient's impression of their current status: Patient indicated feeling down. Patient reported he has reflected a lot on his mental health over the past week. Patient indicated his parents divorce had a huge negative affect on him. Patient reported he stopped caring specifically with college. Patient indicated then moving home to care for his dad and not being as close with his mom. Patient reported he continues to struggle with his relationship with his mom due to the past.     Therapist impression of patients current state: This 25 y.o. White or  male presents with depression. This therapist challenged patient on what type of relationship he wants with his mom. This therapist processed with patient ways he could work on communication with his mom and having his mom come to his place to help build comfort.     Assessment tools used today include: None    Type of psychotherapeutic technique provided: Insight oriented, Client centered and CBT    Progress toward short term goals:Progress as expected with patient continuing therapy, continuing to process the grief from his father passing away.    Review of long term goals: Treatment Plan Developed on 4/23/2020    Diagnosis:   1. Major depressive disorder, recurrent, moderate (H)    2. AUDREY (generalized anxiety disorder)        Plan and Follow-up: Patient continuing with weekly therapy. Patient would benefit from identifying ways he continues with guilt over his father passing away. Patient work on continuing to identify the different ways he has processed and continues to process the stages of grief.Patient should work on identifying his continued struggles with his relationship with his mom.     Discharge Criteria/Planning: Patient will continue with follow-up until therapy can be discontinued without return of signs and symptoms.    Performed and documented by DENICE Escoto

## 2021-06-08 NOTE — PROGRESS NOTES
MENTAL HEALTH VISIT NOTE    05/26/2020  Start time: 410    Stop Time: 450   Session # 6    Session Type: Patient is presenting for an Individual session.    Reji Powell is a 25 y.o. male is being seen today for    Chief Complaint   Patient presents with      Follow Up     Video Visit Mental Health   .     Telemedicine Visit: The patient's condition can be safely assessed and treated via synchronous audio and visual telemedicine encounter.      Reason for Telemedicine Visit: Services only offered telehealth    Originating Site (Patient Location): Patient's  home    Distant Site (Provider Location): Provider Remote Setting    Consent:  The patient/guardian has verbally consented to: the potential risks and benefits of telemedicine (video visit) versus in person care; bill my insurance or make self-payment for services provided; and responsibility for payment of non-covered services.     Mode of Communication:  Video Conference via D[employer] due to Gerard not working    As the provider I attest to compliance with applicable laws and regulations related to telemedicine.    Those present for this visit patient and therapist    Follow up in regards to ongoing symptom management of grief and anxiety.     New symptoms or complaints: None    Functional Impairment:   Personal: 3  Family: 3  Social: 2  Work: 3    Clinical assessment of mental status:   Reji Powell presented on time.   He was oriented x3, open and cooperative, and dressed appropriately for this session and weather. His memory was Normal cognitive functioning .  His speech was  Within normal.  Language was intact.  Concentration and focus is Within normal. Psychosis is not noted or reported. He reports his mood is anxious.  Affect is congruent with speech and is Congruent w/content of speech.  Fund of knowledge is adequate. Insight is adequate for therapy. Reviewed and changes made as needed for 5/26/2020    Suicidal/Homicidal Ideation present:  Patient denies suicidal and homicidal ideations/means or plans.     Patient's impression of their current status: Patent reported feeling anxious and worried. Patient indicated today was his first day back at work and went well. Patient reported the same person continues to tell him that he may lose his job and negative things. Patient indicated trying to work on not allowing this person to get inside his head. Patient reported he has had a lot of stressors with finishing moving. Patient indicated he is finding the dynamic between his girlfriend and sister to be challenging and trying to determine how to not be in the middle.     Therapist impression of patients current state: This 25 y.o. White or  male presents with anxiety. This therapist challenged patient on ways he is jumping to conclusions and not sticking to the facts. This therapist processed with patient what is in his control. This therapist processed with patient ways to work on setting boundaries with his sister and girlfriend.     Assessment tools used today include: None    Type of psychotherapeutic technique provided: Insight oriented, Client centered and CBT    Progress toward short term goals:Progress as expected with patient continuing therapy, using skills to reduce symptoms and noticing changes in his mood.     Review of long term goals: Treatment Plan Developed on 4/23/2020    Diagnosis:   1. Major depressive disorder, recurrent, moderate (H)    2. AUDREY (generalized anxiety disorder)        Plan and Follow-up: Patient continuing with weekly therapy. Patient would benefit from identifying ways he continues with guilt over his father passing away. Patient work on continuing to identify the different ways he has processed and continues to process the stages of grief.Patient should work on identifying his continued struggles with his relationship with his mom. Patient would benefit from working on staying in the moment and not jumping to  conclusions.     Discharge Criteria/Planning: Patient will continue with follow-up until therapy can be discontinued without return of signs and symptoms.    Performed and documented by DENICE Escoto

## 2021-06-08 NOTE — PROGRESS NOTES
MENTAL HEALTH VISIT NOTE    05/19/2020  Start time: 815    Stop Time: 855   Session # 5    Session Type: Patient is presenting for an Individual session.    Reji Powell is a 25 y.o. male is being seen today for    Chief Complaint   Patient presents with      Follow Up     Video Visit Mental Health   .     Telemedicine Visit: The patient's condition can be safely assessed and treated via synchronous audio and visual telemedicine encounter.      Reason for Telemedicine Visit: Services only offered telehealth    Originating Site (Patient Location): Patient's vehicle outside of home    Distant Site (Provider Location): Provider Remote Setting    Consent:  The patient/guardian has verbally consented to: the potential risks and benefits of telemedicine (video visit) versus in person care; bill my insurance or make self-payment for services provided; and responsibility for payment of non-covered services.     Mode of Communication:  Video Conference via D[employer] due to Gerard not working    As the provider I attest to compliance with applicable laws and regulations related to telemedicine.    Those present for this visit patient and therapist    Follow up in regards to ongoing symptom management of grief and anxiety.     New symptoms or complaints: None    Functional Impairment:   Personal: 3  Family: 3  Social: 2  Work: 3    Clinical assessment of mental status:   Reji Powell presented on time.   He was oriented x3, open and cooperative, and dressed appropriately for this session and weather. His memory was Normal cognitive functioning .  His speech was  Within normal.  Language was intact.  Concentration and focus is Within normal. Psychosis is not noted or reported. He reports his mood is anxious.  Affect is congruent with speech and is Congruent w/content of speech.  Fund of knowledge is adequate. Insight is adequate for therapy. Reviewed and changes made as needed.    Suicidal/Homicidal Ideation present:  Patient denies suicidal and homicidal ideations/means or plans.     Patient's impression of their current status: Patent indicated feeling anxious. Patient reported hearing from his work that they may be bringing him back next week. Patent indicated then hearing from a co-worker one carline does not want him back and that it may not be until July. Patient reported just moving so this has caused a significant amount of stress for him. Patient indicated his girlfriend has been more emotional as well which has affected his mood. Patient reported struggling with not having a routine.     Therapist impression of patients current state: This 25 y.o. White or  male presents with depression. This therapist challenged patient on ways he is jumping to conclusions and not looking at facts. This therapist processed with patient working on being in the moment.     Assessment tools used today include: None    Type of psychotherapeutic technique provided: Insight oriented, Client centered and CBT    Progress toward short term goals:Progress as expected with patient continuing therapy and noticing changes in his mood.     Review of long term goals: Treatment Plan Developed on 4/23/2020    Diagnosis:   1. Major depressive disorder, recurrent, moderate (H)    2. AUDREY (generalized anxiety disorder)        Plan and Follow-up: Patient continuing with weekly therapy. Patient would benefit from identifying ways he continues with guilt over his father passing away. Patient work on continuing to identify the different ways he has processed and continues to process the stages of grief.Patient should work on identifying his continued struggles with his relationship with his mom. Patient would benefit from working on staying in the moment and not jumping to conclusions.     Discharge Criteria/Planning: Patient will continue with follow-up until therapy can be discontinued without return of signs and symptoms.    Performed and documented by  Sandra Mccarty, [facility]

## 2021-06-08 NOTE — PROGRESS NOTES
MENTAL HEALTH VISIT NOTE    06/17/2020  Start time: 403    Stop Time: 445   Session # 8    Session Type: Patient is presenting for an Individual session.    Reji Powell is a 25 y.o. male is being seen today for    Chief Complaint   Patient presents with      Follow Up     Video Visit Mental Health   .     Telemedicine Visit: The patient's condition can be safely assessed and treated via synchronous audio and visual telemedicine encounter.      Reason for Telemedicine Visit: Services only offered telehealth    Originating Site (Patient Location): Patient's  home    Distant Site (Provider Location): Provider Remote Setting    Consent:  The patient/guardian has verbally consented to: the potential risks and benefits of telemedicine (video visit) versus in person care; bill my insurance or make self-payment for services provided; and responsibility for payment of non-covered services.     Mode of Communication:  Video Conference via  [employer]tag     As the provider I attest to compliance with applicable laws and regulations related to telemedicine.    Those present for this visit patient and therapist    Follow up in regards to ongoing symptom management of grief and anxiety.     New symptoms or complaints: None    Functional Impairment:   Personal: 3  Family: 3  Social: 2  Work: 3    Clinical assessment of mental status:   Reji Powell presented on time.   He was oriented x3, open and cooperative, and dressed appropriately for this session and weather. His memory was Normal cognitive functioning .  His speech was  Within normal.  Language was intact.  Concentration and focus is Within normal. Psychosis is not noted or reported. He reports his mood is down.  Affect is congruent with speech and is Congruent w/content of speech.  Fund of knowledge is adequate. Insight is adequate for therapy. Reviewed and no changes made as needed for 6/17/2020    Suicidal/Homicidal Ideation present: Patient denies suicidal and  homicidal ideations/means or plans.     Patient's impression of their current status: Donald reported feeling down. Patent indicated work has been stressful for him due to his co-worker. Patient reported he is struggling with his co-worker not helping out and patient having to take care of everything. Patient indicated enjoying his job so he does not want to complain. Patient reported having some struggles with his girlfriend and her emotions. Patient indicated many times he does not know what to say and wants to be a support.     Therapist impression of patients current state: This 25 y.o. White or  male presents with anxiety. This therapist challenged patient on ways he is struggling with his own self-care. This therapist processed with patient the importance of setting boundaries with different people in his life. This therapist challenged patient on what is important to him and ways to work towards his goals.     Assessment tools used today include: None    Type of psychotherapeutic technique provided: Insight oriented, Client centered and CBT    Progress toward short term goals:Progress as expected with patient continuing therapy, using skills to reduce symptoms, noticing stages of grief, identifying struggles with his girlfriend and work and noticing changes in his mood.     Review of long term goals: Treatment Plan Developed on 4/23/2020    Diagnosis:   1. Major depressive disorder, recurrent, moderate (H)    2. AUDREY (generalized anxiety disorder)        Plan and Follow-up: Patient continuing with weekly therapy. Patient would benefit from identifying ways he continues with guilt over his father passing away. Patient work on continuing to identify the different ways he has processed and continues to process the stages of grief.Patient should work on identifying his continued struggles with his relationship with his mom. Patient would benefit from working on staying in the moment and not jumping to  conclusions.     Discharge Criteria/Planning: Patient will continue with follow-up until therapy can be discontinued without return of signs and symptoms.    Performed and documented by DENICE Escoto

## 2021-06-09 ENCOUNTER — OFFICE VISIT - HEALTHEAST (OUTPATIENT)
Dept: FAMILY MEDICINE | Facility: CLINIC | Age: 26
End: 2021-06-09

## 2021-06-09 DIAGNOSIS — L05.91 PILONIDAL CYST WITHOUT ABSCESS: ICD-10-CM

## 2021-06-09 NOTE — PROGRESS NOTES
MENTAL HEALTH VISIT NOTE    06/23/2020  Start time: 402    Stop Time: 450   Session # 9    Session Type: Patient is presenting for an Individual session.    Reji Powell is a 25 y.o. male is being seen today for    Chief Complaint   Patient presents with      Follow Up     Video Visit Mental Health   .     Telemedicine Visit: The patient's condition can be safely assessed and treated via synchronous audio and visual telemedicine encounter.      Reason for Telemedicine Visit: Services only offered telehealth    Originating Site (Patient Location): Patient's  home    Distant Site (Provider Location): Provider Remote Setting    Consent:  The patient/guardian has verbally consented to: the potential risks and benefits of telemedicine (video visit) versus in person care; bill my insurance or make self-payment for services provided; and responsibility for payment of non-covered services.     Mode of Communication:  Video Conference via  [employer]     As the provider I attest to compliance with applicable laws and regulations related to telemedicine.    Those present for this visit patient and therapist    Follow up in regards to ongoing symptom management of grief and anxiety.     New symptoms or complaints: None    Functional Impairment:   Personal: 3  Family: 3  Social: 2  Work: 3    Clinical assessment of mental status:   Reji Powell presented on time.   He was oriented x3, open and cooperative, and dressed appropriately for this session and weather. His memory was Normal cognitive functioning .  His speech was  Within normal.  Language was intact.  Concentration and focus is Within normal. Psychosis is not noted or reported. He reports his mood is frustrated.  Affect is congruent with speech and is Congruent w/content of speech.  Fund of knowledge is adequate. Insight is adequate for therapy. Reviewed and no changes made as needed for 6/23/2020    Suicidal/Homicidal Ideation present: Patient denies suicidal  and homicidal ideations/means or plans.     Patient's impression of their current status: Patent indicated feeling frustrated. Patient reported things continue to not go well at work. Patient indicated at this point he is trying to decide if he needs to say something to upper management. Patient reported he notices that some days he is okay but other days getting extremely upset. Patient indicated he has been talking to other co-workers and feels they are aware. Patient reported trying to weigh out the pros and cons of talking to upper management. Patient indicated he also went to his cousins graduation that brought up emotions. Patient reported this is his dad's side of the family. Patient indicated his grandpa getting teary when they left. Patient reported he does not talk to his grandparents often and no one really talks about his dad.     Therapist impression of patients current state: This 25 y.o. White or  male presents with frustration and sadness. This therapist challenged patient on the pros and cons of talking to upper management. This therapist processed with patient setting a timeframe of when he wants change to occur and if it has not occurred at that point to look for change. This therapist challenged patient on the type of relationship he wants with his dad's side of the family at this time. This therapist processed with patient ways to work on connecting more with his family.     Assessment tools used today include: None    Type of psychotherapeutic technique provided: Insight oriented, Client centered and CBT    Progress toward short term goals:Progress as expected with patient continuing therapy, noticing irritation with work and identifying struggles with his dad's side of the family.     Review of long term goals: Treatment Plan Developed on 4/23/2020    Diagnosis:   1. Major depressive disorder, recurrent, moderate (H)    2. AUDREY (generalized anxiety disorder)        Plan and Follow-up:  Patient continuing with weekly therapy. Patient would benefit from identifying ways he continues with guilt over his father passing away. Patient work on continuing to identify the different ways he has processed and continues to process the stages of grief.Patient should work on identifying his continued struggles with his relationship with his mom. Patient would benefit from working on staying in the moment and not jumping to conclusions.     Discharge Criteria/Planning: Patient will continue with follow-up until therapy can be discontinued without return of signs and symptoms.    Performed and documented by DENICE Escoto

## 2021-06-09 NOTE — PROGRESS NOTES
MENTAL HEALTH VISIT NOTE    7/14/2020  Start time: 402    Stop Time: 445   Session # 11    Session Type: Patient is presenting for an Individual session.    Reji Powell is a 25 y.o. male is being seen today for    Chief Complaint   Patient presents with      Follow Up     Video Visit Mental Health   .     Telemedicine Visit: The patient's condition can be safely assessed and treated via synchronous audio and visual telemedicine encounter.      Reason for Telemedicine Visit: Services only offered telehealth    Originating Site (Patient Location): Patient's home    Distant Site (Provider Location): Provider Remote Setting    Consent:  The patient/guardian has verbally consented to: the potential risks and benefits of telemedicine (video visit) versus in person care; bill my insurance or make self-payment for services provided; and responsibility for payment of non-covered services.     Mode of Communication:  Video Conference via  [employer]     As the provider I attest to compliance with applicable laws and regulations related to telemedicine.    Those present for this visit patient and therapist    Follow up in regards to ongoing symptom management of grief and anxiety.     New symptoms or complaints: None    Functional Impairment:   Personal: 3  Family: 3  Social: 2  Work: 3    Clinical assessment of mental status:   Reji Powell presented on time.   He was oriented x3, open and cooperative, and dressed appropriately for this session and weather. His memory was Normal cognitive functioning .  His speech was  Within normal.  Language was intact.  Concentration and focus is Within normal. Psychosis is not noted or reported. He reports his mood is anxious.  Affect is congruent with speech and is Congruent w/content of speech.  Fund of knowledge is adequate. Insight is adequate for therapy. Reviewed and no changes made as needed for 7/14/2020.    Suicidal/Homicidal Ideation present: Patient denies suicidal and  homicidal ideations/means or plans.     Patient's impression of their current status: Patent indicated feeling anxious. Patient reported he has continued to avoid tough conversations. Patient indicated his girlfriend continues to get overly involved in social media and upset when patient does not react a certain way. Patient reported knowing he needs to have a conversation with her about not wanting to be involved in social media and needing to distance himself during that time. Patient indicated he still has not talked to his sister about expectations related to cleaning. Patient reported again knowing it is a conversation that will be tough but not having it isn't working either.     Therapist impression of patients current state: This 25 y.o. White or  male presents with feeling anxious. This therapist challenged patient on his avoidance works in the short run but not the long run. This therapist processed with patient ways he notices his anxiety getting worse by not talking about or confronting certain situations.     Assessment tools used today include: None    Type of psychotherapeutic technique provided: Insight oriented, Client centered and CBT    Progress toward short term goals:Progress as expected with patient continuing therapy, working on grief and noticing his symptoms.     Review of long term goals: Treatment Plan Developed on 4/23/2020    Diagnosis:   1. Major depressive disorder, recurrent, moderate (H)    2. AUDREY (generalized anxiety disorder)        Plan and Follow-up: Patient continuing with weekly therapy. Patient would benefit from continuing to identify ways that avoidance works in the short run but not the long run. Patient should work on identify worries of confronting situations with his girlfriend and his sister.     Discharge Criteria/Planning: Patient will continue with follow-up until therapy can be discontinued without return of signs and symptoms.    Performed and documented  by Sandra Mccarty [facility]

## 2021-06-09 NOTE — PROGRESS NOTES
MENTAL HEALTH VISIT NOTE    06/30/2020  Start time: 403    Stop Time: 447   Session # 10    Session Type: Patient is presenting for an Individual session.    Reji Powell is a 25 y.o. male is being seen today for    Chief Complaint   Patient presents with      Follow Up     Video Visit Mental Health   .     Telemedicine Visit: The patient's condition can be safely assessed and treated via synchronous audio and visual telemedicine encounter.      Reason for Telemedicine Visit: Services only offered telehealth    Originating Site (Patient Location): Patient's home    Distant Site (Provider Location): Provider Remote Setting    Consent:  The patient/guardian has verbally consented to: the potential risks and benefits of telemedicine (video visit) versus in person care; bill my insurance or make self-payment for services provided; and responsibility for payment of non-covered services.     Mode of Communication:  Video Conference via  [employer]     As the provider I attest to compliance with applicable laws and regulations related to telemedicine.    Those present for this visit patient and therapist    Follow up in regards to ongoing symptom management of grief and anxiety.     New symptoms or complaints: None    Functional Impairment:   Personal: 3  Family: 3  Social: 2  Work: 3    Clinical assessment of mental status:   Reji Powell presented on time.   He was oriented x3, open and cooperative, and dressed appropriately for this session and weather. His memory was Normal cognitive functioning .  His speech was  Within normal.  Language was intact.  Concentration and focus is Within normal. Psychosis is not noted or reported. He reports his mood is concerned.  Affect is congruent with speech and is Congruent w/content of speech.  Fund of knowledge is adequate. Insight is adequate for therapy. Reviewed and no changes made as needed for 6/30/2020    Suicidal/Homicidal Ideation present: Patient denies suicidal and  homicidal ideations/means or plans.     Patient's impression of their current status: Patent reported feeling concerned. Patient indicated his friend inviting him out to dinner and having no interest. Patient reported then his mom inviting him to her significant other's cabin and again having no interest. Patient indicated the reason for not wanting to go being different for each one. Patient reported realizing with his friend he is tired after working all day. Patient indicated with his mom still be uncomfortable with the situation. Patient reported he does spend time with his friend and enjoys having them over.      Therapist impression of patients current state: This 25 y.o. White or  male presents with feeling concerned. This therapist challenged patient as to the reason's why he does not want to go to the two different places. This therapist processed with patient being able to have people over to his house where he feel more comfortable. This therapist processed with patient his relationship with his mom.     Assessment tools used today include: None    Type of psychotherapeutic technique provided: Insight oriented, Client centered and CBT    Progress toward short term goals:Progress as expected with patient continuing therapy, noticing struggles in relationships and identifying change.     Review of long term goals: Treatment Plan Developed on 4/23/2020    Diagnosis:   1. Major depressive disorder, recurrent, moderate (H)    2. AUDREY (generalized anxiety disorder)        Plan and Follow-up: Patient continuing with weekly therapy. Patient would benefit from identifying ways he continues with guilt over his father passing away. Patient work on continuing to identify the different ways he has processed and continues to process the stages of grief.Patient should work on identifying his continued struggles with his relationship with his mom. Patient would benefit from working on staying in the moment and  not jumping to conclusions.     Discharge Criteria/Planning: Patient will continue with follow-up until therapy can be discontinued without return of signs and symptoms.    Performed and documented by DENICE Escoto

## 2021-06-10 NOTE — PROGRESS NOTES
MENTAL HEALTH VISIT NOTE    8/4/2020  Start time: 401    Stop Time: 447   Session # 12    Session Type: Patient is presenting for an Individual session.    Reji Powell is a 25 y.o. male is being seen today for    Chief Complaint   Patient presents with      Follow Up     Video Visit Mental Health   .     Telemedicine Visit: The patient's condition can be safely assessed and treated via synchronous audio and visual telemedicine encounter.      Reason for Telemedicine Visit: Services only offered telehealth    Originating Site (Patient Location): Patient's vehicle     Distant Site (Provider Location): Provider Remote Setting    Consent:  The patient/guardian has verbally consented to: the potential risks and benefits of telemedicine (video visit) versus in person care; bill my insurance or make self-payment for services provided; and responsibility for payment of non-covered services.     Mode of Communication:  Video Conference via  [employer]     As the provider I attest to compliance with applicable laws and regulations related to telemedicine.    Those present for this visit patient and therapist    Follow up in regards to ongoing symptom management of grief and anxiety.     New symptoms or complaints: None    Functional Impairment:   Personal: 3  Family: 3  Social: 2  Work: 3    Clinical assessment of mental status:   Reji Powell presented on time.   He was oriented x3, open and cooperative, and dressed appropriately for this session and weather. His memory was Normal cognitive functioning .  His speech was  Within normal.  Language was intact.  Concentration and focus is Within normal. Psychosis is not noted or reported. He reports his mood is worried.  Affect is congruent with speech and is Congruent w/content of speech.  Fund of knowledge is adequate. Insight is adequate for therapy. Reviewed and no changes made as needed for 8/4/2020.    Suicidal/Homicidal Ideation present: Patient denies suicidal and  homicidal ideations/means or plans.     Patient's impression of their current status: Patent reported feeling worried. Patient indicated having a falling out with his co-worker a few weeks ago. Patient reported it resulted in the manager coming and both of them said a lot of things. Patient indicated in the end they both ended up apologizing. Patient reported being very uncomfortable that day with the confrontation. Patient indicated realizing his co-worker will continue to do minimal and needing to accept that if he wants to stay at this job. Patient reported feeling that he has neglected a lot of people in his life. Patient indicated people will text and call him and a lot of times he does not respond. Patient reported he does not do it on purpose but a few days will go by and then he does not see a point in responding. Patient indicated he enjoys his time at home and it feels like work to have to be around people.     Therapist impression of patients current state: This 25 y.o. White or  male presents with feeling worried. This therapist challenged patient on what he wants from his job at this time. This therapists processed with patient the importance of being able to see the good in his job and what he enjoys from his job at this time. This therapist processed with patient ways to work on continuing with healthy friendships without feeling he is giving too much.     Assessment tools used today include: None    Type of psychotherapeutic technique provided: Insight oriented, Client centered and CBT    Progress toward short term goals:Progress as expected with patient continuing therapy, identifying challenged and work and talking about struggles with friends.     Review of long term goals: Treatment Plan Developed on 8/4/2020    Diagnosis:   1. Major depressive disorder, recurrent, moderate (H)    2. AUDREY (generalized anxiety disorder)        Plan and Follow-up: Patient continuing with bi-weekly therapy.  Patient should continue to work on identifying what he enjoys about his job and if he wants to continue at his job at this point. Patient would benefit from working on replying to his friends in the same day and spending time with them.     Discharge Criteria/Planning: Patient will continue with follow-up until therapy can be discontinued without return of signs and symptoms.    Performed and documented by [facility]

## 2021-06-10 NOTE — PROGRESS NOTES
Outpatient Mental Health Treatment Plan    Name:  Reji Powell  :  1995  MRN:  [id number]    Treatment Plan:  Updated Treatment Plan  Intake/initial treatment plan date:  2020  Benefit and risks and alternatives have been discussed: YES  Is this treatment appropriate with minimal intrusion/restrictions: YES   Estimated duration of treatment:  Ongoing therapy at this time  Anticipated frequency of services:  Every 2 weeks  Necessity for frequency: This frequency is needed to establish therapeutic goals and for continuity of care in order to monitor progress.  Necessity for treatment: To address cognitive, behavioral, and/or emotional barriers in order to work toward goals and to improve quality of life.    Session Type: Patient is presenting for an Individual session.    Plan:           ?   ? Anxiety    Goal:  Decrease average anxiety level from 3 to 1.   Strategies: ? [x]Learn and practice relaxation techniques and other coping        strategies (e.g., thought stopping, reframing, meditation)     ? [x] Increase involvement in meaningful activities     ? [x] Discuss sleep hygiene     ? [x] Explore thoughts and expectations about self and others     ? [x] Identify and monitor triggers for panic/anxiety symptoms     ? [x] Implement physical activity routine (with physician approval)     ? [x] Consider introduction of bibliotherapy and/or videos     ? [x] Continue compliance with medical treatment plan (or explore  barriers)                                       []     ?Degree to which this is a problem: 3  Degree to which goal is met: 2    Date of next review: 2020       ? Depression    Goal:  Decrease average depression level from 4 to 1.   Strategies:    ?[x] Decrease social isolation     [x] Increase involvement in meaningful activities     ?[x] Discuss sleep hygiene     ?[x] Explore thoughts and expectations about self and others     ?[x] Process grief (loss of significant person, independence,  role,        etc.)     ?[x] Assess for suicide risk     ?[x] Implement physical activity routine (with physician approval)     [x] Consider introduction of bibliotherapy and/or videos     [x] Continue compliance with medical treatment plan (or explore         barriers)       ?  Degree to which this is a problem: 4  Degree to which goal is met: 2    Date of next review: 11/4/2020     Functional Impairment:  1=Not at all/Rarely  2=Some days  3=Most Days  4=Every Day    Personal : 2  Family : 2  Social : 2  Work/school : 2    Diagnosis:   Major depressive disorder, recurrent, moderate; Generalized Anxiety disorder;       Clinical assessments and measures completed:. AUDREY-7 and PHQ-9 C-SSR     Strengths:  Hard working  Limitations: Confrontation   Cultural Considerations: Patient is a 24 year old single white male.     Persons responsible for this plan: Patient and Provider            Psychotherapist Signature           Patient Signature:              Guardian Signature             Provider: Performed and documented by DENICE Escoto   Date:  8/5/2020

## 2021-06-10 NOTE — PROGRESS NOTES
MENTAL HEALTH VISIT NOTE    8/18/2020  Start time: 405    Stop Time: 445   Session # 13    Session Type: Patient is presenting for an Individual session.    Reji Powell is a 25 y.o. male is being seen today for    Chief Complaint   Patient presents with      Follow Up     Video Visit Mental Health   .     Telemedicine Visit: The patient's condition can be safely assessed and treated via synchronous audio and visual telemedicine encounter.      Reason for Telemedicine Visit: Services only offered telehealth    Originating Site (Patient Location): Patient's home    Distant Site (Provider Location): Provider Remote Setting    Consent:  The patient/guardian has verbally consented to: the potential risks and benefits of telemedicine (video visit) versus in person care; bill my insurance or make self-payment for services provided; and responsibility for payment of non-covered services.     Mode of Communication:  Video Conference via  [employer]     As the provider I attest to compliance with applicable laws and regulations related to telemedicine.    Those present for this visit patient and therapist    Follow up in regards to ongoing symptom management of grief and anxiety.     New symptoms or complaints: None    Functional Impairment:   Personal: 3  Family: 3  Social: 2  Work: 3    Clinical assessment of mental status:   Reji Powell presented on time.   He was oriented x3, open and cooperative, and dressed appropriately for this session and weather. His memory was Normal cognitive functioning .  His speech was  Within normal.  Language was intact.  Concentration and focus is Within normal. Psychosis is not noted or reported. He reports his mood is stressed.  Affect is congruent with speech and is Congruent w/content of speech.  Fund of knowledge is adequate. Insight is adequate for therapy. Reviewed and no changes made as needed for 8/18/2020.    Suicidal/Homicidal Ideation present: Patient denies suicidal and  homicidal ideations/means or plans.     Patient's impression of their current status: Patent reported feeling stressed.Patient indicated there is a lot he wants to accomplish but feels he does not have the time. Patient reported then he will beat himself up over not getting things done. Patient indicated he has a hard time asking for help and feels he should take it all on himself. Patient reported this started when he moved back in with his dad and took care of his dad. Patient indicated learning then that he needed to be independent. Patient reported he continues to want a different relationship with his mom but at this point unsure if he wants to put in the work to make that change.     Therapist impression of patients current state: This 25 y.o. White or  male presents with feeling stressed. This therapist challenged patient on ways he continues to take on too much. This therapist processed with patient the importance of being able to ask for help. This therapist challenged patient on ways he continues to avoid taking steps to have a better relationship with is mom.     Assessment tools used today include: None    Type of psychotherapeutic technique provided: Insight oriented, Client centered and CBT    Progress toward short term goals:Progress as expected with patient continuing therapy, identifying challenged, reaching out to friends and finding positive in his job.     Review of long term goals: Treatment Plan Developed on 8/4/2020    Diagnosis:   1. Major depressive disorder, recurrent, moderate (H)    2. AUDREY (generalized anxiety disorder)        Plan and Follow-up: Patient continuing with bi-weekly therapy. Patient would benefit from working on asking for help. Patient should continue to identify what he wants his relationship with his mom to look like and ways he needs to continue to grief his dad before moving forward.     Discharge Criteria/Planning: Patient will continue with follow-up until  therapy can be discontinued without return of signs and symptoms.    Performed and documented by DENICE Escoto

## 2021-06-11 NOTE — PROGRESS NOTES
MENTAL HEALTH VISIT NOTE    9/29/2020  Start time: 402    Stop Time: 448   Session # 16    Session Type: Patient is presenting for an Individual session.    Reji Powell is a 25 y.o. male is being seen today for    Chief Complaint   Patient presents with      Follow Up     Video Visit Mental Health   .     Telemedicine Visit: The patient's condition can be safely assessed and treated via synchronous audio and visual telemedicine encounter.      Reason for Telemedicine Visit: Services only offered telehealth    Originating Site (Patient Location): Patient's home    Distant Site (Provider Location): Provider Remote Setting    Consent:  The patient/guardian has verbally consented to: the potential risks and benefits of telemedicine (video visit) versus in person care; bill my insurance or make self-payment for services provided; and responsibility for payment of non-covered services.     Mode of Communication:  Video Conference via  [employer]     As the provider I attest to compliance with applicable laws and regulations related to telemedicine.    Those present for this visit patient and therapist    Follow up in regards to ongoing symptom management of grief and anxiety.     New symptoms or complaints: None    Functional Impairment:   Personal: 3  Family: 3  Social: 2  Work: 3    Clinical assessment of mental status:   Reji Powell presented on time.   He was oriented x3, open and cooperative, and dressed appropriately for this session and weather. His memory was Normal cognitive functioning .  His speech was  Within normal.  Language was intact.  Concentration and focus is Within normal. Psychosis is not noted or reported. He reports his mood is down.  Affect is congruent with speech and is Congruent w/content of speech.  Fund of knowledge is adequate. Insight is adequate for therapy. Reviewed and no changes made as needed for 9/29/2020.    Suicidal/Homicidal Ideation present: Patient denies suicidal and  homicidal ideations/means or plans.     Patient's impression of their current status: Patent indicated having days where he felt down. Patient reported realizing that last week would have been his dad's birthday. Patient indicated then October being the month that his parts  and his life changed. Patient reported realizing these were significant events that likely have affected his mood. Patient indicated he did have dinner with his mom and feeling it went well. Patient reported being at a spot now where he believes time is important.    Therapist impression of patients current state: This 25 y.o. White or  male presents with feeling down. This therapist processed with patient ways anniversary's can affect a person's mood without a person being aware. This therapist processed with patient ways to work on celebrating his dad's.    Assessment tools used today include: None    Type of psychotherapeutic technique provided: Insight oriented, Client centered and CBT    Progress toward short term goals:Progress as expected with patient continuing therapy, identifying mood and realizes mood may happen.     Review of long term goals: Treatment Plan Developed on 8/4/2020    Diagnosis:   1. Major depressive disorder, recurrent, moderate (H)    2. AUDREY (generalized anxiety disorder)        Plan and Follow-up: Patient continuing with monthly therapy. Patient should continue to work on identifying changes in his mood. Patient would benefit from continuing to work on self-care.    Discharge Criteria/Planning: Patient will continue with follow-up until therapy can be discontinued without return of signs and symptoms.    Performed and documented by DENICE Escoto

## 2021-06-11 NOTE — PROGRESS NOTES
MENTAL HEALTH VISIT NOTE    9/15/2020  Start time: 404    Stop Time: 444   Session # 15    Session Type: Patient is presenting for an Individual session.    Reji Powell is a 25 y.o. male is being seen today for    Chief Complaint   Patient presents with      Follow Up     Video Visit Mental Health   .     Telemedicine Visit: The patient's condition can be safely assessed and treated via synchronous audio and visual telemedicine encounter.      Reason for Telemedicine Visit: Services only offered telehealth    Originating Site (Patient Location): Patient's home    Distant Site (Provider Location): Provider Remote Setting    Consent:  The patient/guardian has verbally consented to: the potential risks and benefits of telemedicine (video visit) versus in person care; bill my insurance or make self-payment for services provided; and responsibility for payment of non-covered services.     Mode of Communication:  Video Conference via  [employer]     As the provider I attest to compliance with applicable laws and regulations related to telemedicine.    Those present for this visit patient and therapist    Follow up in regards to ongoing symptom management of grief and anxiety.     New symptoms or complaints: None    Functional Impairment:   Personal: 3  Family: 3  Social: 2  Work: 3    Clinical assessment of mental status:   Reji Powell presented on time.   He was oriented x3, open and cooperative, and dressed appropriately for this session and weather. His memory was Normal cognitive functioning .  His speech was  Within normal.  Language was intact.  Concentration and focus is Within normal. Psychosis is not noted or reported. He reports his mood is down.  Affect is congruent with speech and is Congruent w/content of speech.  Fund of knowledge is adequate. Insight is adequate for therapy. Reviewed and no changes made as needed for 9/15/2020.    Suicidal/Homicidal Ideation present: Patient denies suicidal and  homicidal ideations/means or plans.     Patient's impression of their current status: Patent reported feeling down. Patient indicated he continues to struggle with his relationship with his mom. Patient reported she invited them to the cabin again and patient not wanting to go. Patient indicated he avoids having the conversation with his mom that he is not comfortable going to the cabin. Patient reported knowing it is time to have that conversation with his mom that he is still uncomfortable and grieving his dad.     Therapist impression of patients current state: This 25 y.o. White or  male presents with feeling down. This therapist challenged patient on what he wants from his relationship with his mom at this point. This therapist processed with patient being honest with his mom about his emotions at this time. This therapist processed with patient ways he is struggling with grief.     Assessment tools used today include: None    Type of psychotherapeutic technique provided: Insight oriented, Client centered and CBT    Progress toward short term goals:Progress as expected with patient continuing therapy, working on identifying his emotions and working on self-care.      Review of long term goals: Treatment Plan Developed on 8/4/2020    Diagnosis:   1. Major depressive disorder, recurrent, moderate (H)    2. AUDREY (generalized anxiety disorder)        Plan and Follow-up: Patient continuing with bi-weekly therapy. Patient would benefit from talking with his mom about his emotions and what he wants at this time. Patient should continue to work on grieving his dad.     Discharge Criteria/Planning: Patient will continue with follow-up until therapy can be discontinued without return of signs and symptoms.    Performed and documented by DENICE Escoto

## 2021-06-11 NOTE — PROGRESS NOTES
MENTAL HEALTH VISIT NOTE    9/1/2020  Start time: 403    Stop Time: 443   Session # 14    Session Type: Patient is presenting for an Individual session.    Reji Powell is a 25 y.o. male is being seen today for    Chief Complaint   Patient presents with      Follow Up     Video Visit Mental Health   .     Telemedicine Visit: The patient's condition can be safely assessed and treated via synchronous audio and visual telemedicine encounter.      Reason for Telemedicine Visit: Services only offered telehealth    Originating Site (Patient Location): Patient's home    Distant Site (Provider Location): Provider Remote Setting    Consent:  The patient/guardian has verbally consented to: the potential risks and benefits of telemedicine (video visit) versus in person care; bill my insurance or make self-payment for services provided; and responsibility for payment of non-covered services.     Mode of Communication:  Video Conference via  [employer]     As the provider I attest to compliance with applicable laws and regulations related to telemedicine.    Those present for this visit patient and therapist    Follow up in regards to ongoing symptom management of grief and anxiety.     New symptoms or complaints: None    Functional Impairment:   Personal: 3  Family: 3  Social: 2  Work: 3    Clinical assessment of mental status:   Reji Powell presented on time.   He was oriented x3, open and cooperative, and dressed appropriately for this session and weather. His memory was Normal cognitive functioning .  His speech was  Within normal.  Language was intact.  Concentration and focus is Within normal. Psychosis is not noted or reported. He reports his mood is confused.  Affect is congruent with speech and is Congruent w/content of speech.  Fund of knowledge is adequate. Insight is adequate for therapy. Reviewed and no changes made as needed for 9/1/2020.    Suicidal/Homicidal Ideation present: Patient denies suicidal and  homicidal ideations/means or plans.     Patient's impression of their current status: Patent indicated feeling confused. Patient reported he unsure about his relationship with his mom. Patient indicated he has many different emotions. Patient reported part of him still feels she has not been honest with him. Patient indicated knowing there relationship will not improve until that have a serious conversation. Patient reported being unsure what he wants to hear and if his mom will ever be honest. Patient indicated he had to take on a lot due to his mom leaving and knowing that has affected his mental health and life as a whole.     Therapist impression of patients current state: This 25 y.o. White or  male presents with feeling confused. This therapist challenged patient on if he is ready to try and confront his mom. This therapist processed with patient ways the relationship will continue to be stuck if he does not make change. This therapist processed with patient his fears related to change.     Assessment tools used today include: None    Type of psychotherapeutic technique provided: Insight oriented, Client centered and CBT    Progress toward short term goals:Progress as expected with patient continuing therapy, using skills to reduce symptoms and working on express needs.     Review of long term goals: Treatment Plan Developed on 8/4/2020    Diagnosis:   1. Major depressive disorder, recurrent, moderate (H)    2. AUDREY (generalized anxiety disorder)        Plan and Follow-up: Patient continuing with bi-weekly therapy. Patient should work on identifying what type of relationship he wants with his mom. Patient would benefit from continuing to identify the hurt he has experienced from his mom.     Discharge Criteria/Planning: Patient will continue with follow-up until therapy can be discontinued without return of signs and symptoms.    Performed and documented by DENICE Escoto

## 2021-06-13 NOTE — PROGRESS NOTES
Outpatient Mental Health Treatment Plan    Name:  Reji Powell  :  1995  MRN:  [id number]    Treatment Plan:  Updated Treatment Plan  Intake/initial treatment plan date:  2020  Benefit and risks and alternatives have been discussed: YES  Is this treatment appropriate with minimal intrusion/restrictions: YES   Estimated duration of treatment:  Ongoing therapy at this time  Anticipated frequency of services:  Every 2 weeks  Necessity for frequency: This frequency is needed to establish therapeutic goals and for continuity of care in order to monitor progress.  Necessity for treatment: To address cognitive, behavioral, and/or emotional barriers in order to work toward goals and to improve quality of life.    Session Type: Patient is presenting for an Individual session.    Plan:           ?   ? Anxiety    Goal:  Decrease average anxiety level from 3 to 1.   Strategies: ? [x]Learn and practice relaxation techniques and other coping        strategies (e.g., thought stopping, reframing, meditation)     ? [x] Increase involvement in meaningful activities     ? [x] Discuss sleep hygiene     ? [x] Explore thoughts and expectations about self and others     ? [x] Identify and monitor triggers for panic/anxiety symptoms     ? [x] Implement physical activity routine (with physician approval)     ? [x] Consider introduction of bibliotherapy and/or videos     ? [x] Continue compliance with medical treatment plan (or explore  barriers)                                       []     ?Degree to which this is a problem: 3  Degree to which goal is met: 3    Date of next review: None-Patient met goal       ? Depression    Goal:  Decrease average depression level from 4 to 1.   Strategies:    ?[x] Decrease social isolation     [x] Increase involvement in meaningful activities     ?[x] Discuss sleep hygiene     ?[x] Explore thoughts and expectations about self and others     ?[x] Process grief (loss of significant person,  independence, role,        etc.)     ?[x] Assess for suicide risk     ?[x] Implement physical activity routine (with physician approval)     [x] Consider introduction of bibliotherapy and/or videos     [x] Continue compliance with medical treatment plan (or explore         barriers)       ?  Degree to which this is a problem: 4  Degree to which goal is met: 4    Date of next review: Patient met goal     Functional Impairment:  1=Not at all/Rarely  2=Some days  3=Most Days  4=Every Day    Personal : 2  Family : 2  Social : 2  Work/school : 2    Diagnosis:   Major depressive disorder, recurrent, moderate; Generalized Anxiety disorder;       Clinical assessments and measures completed:. AUDREY-7 and PHQ-9 C-SSR     Strengths:  Hard working  Limitations: Confrontation   Cultural Considerations: Patient is a 24 year old single white male.     Persons responsible for this plan: Patient and Provider            Psychotherapist Signature           Patient Signature:              Guardian Signature             Provider: Performed and documented by DENICE Escoto   Date:  11/12/2020

## 2021-06-13 NOTE — PROGRESS NOTES
MENTAL HEALTH PSYCHOTHERAPY NOTE    11/11/2020  Start time: 401    Stop Time: 441   Session # 17    Session Type: Patient is presenting for an Individual session.    Reji Powell is a 25 y.o. male is being seen today for    Chief Complaint   Patient presents with      Follow Up     Video Visit Mental Health   .     Telemedicine Visit: The patient's condition can be safely assessed and treated via synchronous audio and visual telemedicine encounter.      Reason for Telemedicine Visit: Services only offered telehealth    Originating Site (Patient Location): Patient's home    Distant Site (Provider Location): Provider Remote Setting    Consent:  The patient/guardian has verbally consented to: the potential risks and benefits of telemedicine (video visit) versus in person care; bill my insurance or make self-payment for services provided; and responsibility for payment of non-covered services.     Mode of Communication:  Video Conference via  [employer]     As the provider I attest to compliance with applicable laws and regulations related to telemedicine.    Those present for this visit patient and therapist    Follow up in regards to ongoing symptom management of grief and anxiety.     New symptoms or complaints: None    Functional Impairment:   Personal: 3  Family: 3  Social: 2  Work: 3    Clinical assessment of mental status:   Reji Powell presented on time.   He was oriented x3, open and cooperative, and dressed appropriately for this session and weather. His memory was Normal cognitive functioning .  His speech was  Within normal.  Language was intact.  Concentration and focus is Within normal. Psychosis is not noted or reported. He reports his mood is content.  Affect is congruent with speech and is Congruent w/content of speech.  Fund of knowledge is adequate. Insight is adequate for therapy. Reviewed and no changes made as needed for 11/11/2020.    Suicidal/Homicidal Ideation present: Patient denies  suicidal and homicidal ideations/means or plans.     Patient's impression of their current status: Patent reported feeling content. Patient indicated there have been a few things that have caused him to feel anxious. Patient reported at work they switch him and another carline's location for a short period. Patient indicated knowing this is due to his co-worker and not his actions. Patient reported seeing this as a positive and a way to learn. Patient indicated he has had a few situations with where he lives and stress. Patient reported knowing if he wants change to happen he has to talk with his girlfriend and sister.    Therapist impression of patients current state: This 25 y.o. White or  male presents with feeling content. This therapist processed with patient the importance of continuing to use skills taught in session. This therapist processed with patient ways he has met goals for therapy. This therapist talked with patient about disconnecting therapy due to meeting goals.     Assessment tools used today include: None    Type of psychotherapeutic technique provided: Insight oriented, Client centered and CBT    Progress toward short term goals:Progress as expected with patient continuing therapy and using skills taught in session    Review of long term goals: Treatment Plan Developed on 11/11/2020    Diagnosis:   1. Major depressive disorder, recurrent, moderate (H)    2. AUDREY (generalized anxiety disorder)        Plan and Follow-up: Patient continuing with monthly therapy. Patient would benefit from continuing to use skills taught in session. Patient has met goals and will discontinue therapy. Patient can call the clinic to schedule if feeling he needs to restart therapy.      Discharge Criteria/Planning: Patient discharged due to meeting goals.     Performed and documented by DENICE Escoto

## 2021-06-14 NOTE — TELEPHONE ENCOUNTER
Reason for Call:  Medication or medication refill:    Do you use a [facility]?  Name of the pharmacy and phone number for the current request: [facility]    Name of the medication requested:   buPROPion     Other request: Per patient pharmacy states they are waiting on approval from the provider.     Can we leave a detailed message on this number? Yes    Phone number patient can be reached at: Home number on file [phone] (home)    Best Time: anytime    Call taken on 1/25/2021 at 2:59 PM by Leyla Shea

## 2021-06-16 NOTE — TELEPHONE ENCOUNTER
I called and spoke with patient, relayed documentation, patient verbalized understanding, no further questions/concerns at this time.    Scheduled medication check at this time for 04/2021.

## 2021-06-16 NOTE — TELEPHONE ENCOUNTER
Refill Request  Medication name:   Requested Prescriptions     Pending Prescriptions Disp Refills     sertraline (ZOLOFT) 100 MG tablet 90 tablet 0     Sig: Take 1 tablet (100 mg total) by mouth daily.     Who prescribed the medication: Promise  Last refill on medication: 02/18/21  [facility]ed [facility]  Last appointment with PCP: 04/08/20  Next appointment: Patient due for appointment    RT Olaf (R)

## 2021-06-16 NOTE — TELEPHONE ENCOUNTER
Refill Request  Medication name:   Requested Prescriptions     Pending Prescriptions Disp Refills     sertraline (ZOLOFT) 100 MG tablet 30 tablet 11     Sig: Take 1 tablet (100 mg total) by mouth daily.     Who prescribed the medication: Aziza  Last refill on medication: 02/1/8/21  Requested [facility]acy[facility]  Last appointment with PCP: 04/08/20  Next appointment: Patient due for appointment    RT Olaf (R)

## 2021-06-18 NOTE — PATIENT INSTRUCTIONS - HE
Patient Instructions by Gogo Ervin FNP at 3/11/2020  9:30 AM     Author: Gogo Ervin FNP Service: -- Author Type: Nurse Practitioner    Filed: 3/11/2020  9:59 AM Encounter Date: 3/11/2020 Status: Signed    : Gogo Ervin FNP (Nurse Practitioner)           Preventing Skin Cancer     Use sunscreen of SPF 30 or greater. Apply liberally.   Relaxing in the sun may feel good. But it isnt good for your skin. In fact, the suns harmful rays are the major cause of skin cancer. This is a serious disease that can be life-threatening. People of all ages, races, and backgrounds are at risk.  Skin cancer is the most common cancer in the [place] But in most cases, it can be prevented.  Your role in prevention  You can act today to help prevent skin cancer. Start by avoiding the suns UV (ultraviolet) rays. And dont use tanning beds or lamps. They are no safer than the sun. Taking these steps can help keep you from getting skin cancer. It can also help prevent wrinkles and other aging effects caused by the sun. Make sure your children also follow these safeguards. Now is the time to start taking steps to prevent skin cancer.  When you are outdoors  Protect your skin when you go out during the day. Take safety steps whenever you go out to eat, run errands by car or on foot, or do any outdoor activity. There isnt just one easy way to protect your skin. Its best to follow all of these steps:    Wear tightly woven clothing that covers your skin. Put on a wide-brimmed hat to protect your face, ears, and scalp.    Watch the clock. Try to stay out of the sun between 10 a.m. and 4 p.m. That's when the sun's rays are strongest.    Head for the shade or create your own. Use an umbrella when sitting or strolling.    Know that the suns rays can reflect off sand, water, and snow. This can harm your skin. Take extra care when you are near reflective surfaces.    Keep in mind that even when the weather is hazy or cloudy,  "your skin can be exposed to strong UV rays.    Shield your skin with sunscreen. Also use sunscreen on your childrens skin. Keep babies younger than 6 months old out of the sun.  Tips for using sunscreen  To help prevent skin cancer, choose the right sunscreen and use it correctly. Try these tips:    Choose a sunscreen that has an SPF (sun protection factor) of at least 30. Also choose a sunscreen labeled \"broad spectrum. This will protect you from both UVA and UVB (ultraviolet A and B) rays.    If one brand irritates your skin, try another, such as one without fragrance.    Use a water-resistant sunscreen if you swim or sweat.    Use at least 1 ounce of sunscreen to cover exposed areas. This is enough to fill a shot glass. You might need to adjust the amount depending on your body size.    Put the sunscreen on dry skin about 15 minutes before going outdoors. This gives it time to soak in.    Reapply sunscreen every 2 hours. If youre active, do this more often.    Cover any sun-exposed skin, from your face to your feet. Dont forget your scalp, ears, and lips.    Know that while sunscreen helps protect you, it isnt enough. Sunscreens extend the length of time you can be outdoors before your skin starts to get red. But they don't give you total protection. Using sunscreen doesn't mean you can stay out in the sun for an unlimited time. Your skin cells are still being damaged. You should also wear protective clothing. And try to stay out of the sun as much as you can, especially from 10 a.m. to 4 p.m.  Date Last Reviewed: 7/1/2019 2000-2019 [employer]. [address] [place] [address], [place] [address]. All rights reserved. This information is not intended as a substitute for professional medical care. Always follow your healthcare professional's instructions.        Patient Education   Understanding USDA MyPlate  The USDA (US Department of Agriculture) has guidelines to help you make healthy food choices. These " are called MyPlate. MyPlate shows the food groups that make up healthy meals using the image of a place setting. Before you eat, think about the healthiest choices for what to put onto your plate or into your cup or bowl. To learn more about building a healthy plate, visit www.choosemyplate.gov.       The Food Groups    Fruits: Any fruit or 100% fruit juice counts as part of the Fruit Group. Fruits may be fresh, canned, frozen, or dried, and may be whole, cut-up, or pureed. Make half your plate fruits and vegetables.    Vegetables: Any vegetable or 100% vegetable juice counts as a member of the Vegetable Group. Vegetables may be fresh, frozen, canned, or dried. They can be served raw or cooked and may be whole, cut-up, or mashed. Make half your plate fruits and vegetables.     Grains: All foods made from grains are part of the Grains Group. These include wheat, rice, oats, cornmeal, and barley such as bread, pasta, oatmeal, cereal, tortillas, and grits. Grains should be no more than a quarter of your plate. At least half of your grains should be whole grains.    Protein: This group includes meat, poultry, seafood, beans and peas, eggs, processed soy products (like tofu), nuts (including nut butters), and seeds. Make protein choices no more than a quarter of your plate. Meat and poultry choices should be lean or low fat.    Dairy: All fluid milk products and foods made from milk that contain calcium, like yogurt and cheese are part of the Dairy Group. (Foods that have little calcium, such as cream, butter, and cream cheese, are not part of the group.) Most dairy choices should be low-fat or fat-free.    Oils: These are fats that are liquid at room temperature. They include canola, corn, olive, soybean, and sunflower oil. Foods that are mainly oil include mayonnaise, certain salad dressings, and soft margarines. You should have only 5 to 7 teaspoons of oils a day. You probably already get this much from the food you  eat.  Use [employer]-SA[employer] to Help Build Your Meals  The [employer] can help you plan and track your meals and activity. You can look up individual foods to see or compare their nutritional value. You can get guidelines for what and how much you should eat. You can compare your food choices. And you can assess personal physical activities and see ways you can improve. Go to www.HubHuman.gov/Songvicecker/.    2808-1066 The "[employer]. [address] [place] [address], [place] [address]. All rights reserved. This information is not intended as a substitute for professional medical care. Always follow your healthcare professional's instructions.

## 2021-06-20 NOTE — LETTER
Letter by Gogo Ervin FNP at      Author: Gogo Ervin FNP Service: -- Author Type: --    Filed:  Encounter Date: 3/12/2020 Status: (Other)         Reji Powell  [address]  [place] [address]             March 12, 2020         Dear Mr. Powell,    Below are the results from your recent visit:    Resulted Orders   Lipid [facility]ascade- FASTING   Result Value Ref Range    Cholesterol 255 (H) <=199 mg/dL    Triglycerides 125 <=149 mg/dL    HDL Cholesterol 61 >=40 mg/dL    LDL Calculated 169 (H) <=129 mg/dL    Patient Fasting > 8hrs? Yes    Basic Metabolic Panel   Result Value Ref Range    Sodium 140 136 - 145 mmol/L    Potassium 4.4 3.5 - 5.0 mmol/L    Chloride 105 98 - 107 mmol/L    CO2 26 22 - 31 mmol/L    Anion Gap, Calculation 9 5 - 18 mmol/L    Glucose 90 70 - 125 mg/dL    Calcium 10.0 8.5 - 10.5 mg/dL    BUN 13 8 - 22 mg/dL    Creatinine 0.91 0.70 - 1.30 mg/dL    GFR MDRD Af Amer >60 >60 mL/min/1.73m2    GFR MDRD Non Af Amer >60 >60 mL/min/1.73m2    Narrative    Fasting Glucose reference range is 70-99 mg/dL per  American Diabetes Association (ADA) guidelines.   Hemoglobin   Result Value Ref Range    Hemoglobin 17.0 14.0 - 18.0 g/dL   Thyroid Stimulating Hormone (TSH)   Result Value Ref Range    TSH 0.67 0.30 - 5.00 uIU/mL       Normal lab results excepts for elevated total cholesterol and LDL. I will recommend that you pay close attention to what you eat, reducing both saturated and trans fat in your diet. I will also encourage that you increase daily aerobic exercise. Letter sent.    Please call with questions or contact us using [employer].    Sincerely,        Electronically signed by PEYTON Donis

## 2021-06-26 NOTE — PROGRESS NOTES
Assessment:   1. Pilonidal cyst without abscess  Improved symptom since draining.  Recommend the patient continue to use warm compresses and topical antibiotic and follow-up if symptom worsens for possible I&D.  Patient verbalized understanding     Plan:   1. I have discussed treatment options consisting of observation or excision under local anesthesia.    2. Patient is not inclined to proceed with excision.  3. Verbal patient instruction given.  4. Follow up as needed for acute illness     Subjective:   Reji Powell is a 26 y.o. male who presents for evaluation of a pilonidal cyst located on the crack of his bottom.  Patient reported that his first pilonidal cyst was 4 years ago which was drained and for about 3 years he did not have one until last April and he has had about 2 since then.  Reported that on Sunday he noticed mild pain around his tail bone and in 24 hours the pain increased and by Tuesday the cyst started draining as of today it has done draining.  He wanted to come in and have it checked.    There has been discharge, redness, swelling and pain.      The following portions of the patient's history were reviewed and updated as appropriate: allergies, current medications, past family history, past medical history, past social history, past surgical history and problem list.    Review of Systems  A 12 point comprehensive review of systems was negative except as noted.      Objective:      /70   Pulse 68   Wt 207 lb (93.9 kg)   BMI 29.70 kg/m    Physical Exam    Skin: 2 centimeter subcutaneous nodule over the pilonidal dimple. The lesion is fully healed and closed. There is mild erythema.  There is no tenderness.

## 2021-06-28 NOTE — PROGRESS NOTES
Progress Notes by Gogo Ervin FNP at 3/11/2020  9:30 AM     Author: Gogo Ervin FNP Service: -- Author Type: Nurse Practitioner    Filed: 3/11/2020 10:38 AM Encounter Date: 3/11/2020 Status: Signed    : Gogo Ervin FNP (Nurse Practitioner)       MALE PREVENTATIVE EXAM    Assessment and Plan:   1. Encounter to establish care  2. Encounter for general adult medical examination without abnormal findings  Healthy male exam  - Basic Metabolic Panel  - Hemoglobin  - Thyroid Stimulating Hormone (TSH)    3. Encounter for screening for lipoid disorders  - Lipid Cascade- FASTING    4. Mild intermittent asthma without complication  Stable without treatment.     5. Burping  Stable, baclofen as needed.     6. Moderate episode of recurrent major depressive disorder (H)  Medications: increase wellbutrin to 300 mg daily and continue with sertraline 100 mg daily.  Labs: Comprehensive metabolic profile, CBC and TSH.  Recommended counseling.  Handouts describing disease, natural history, and treatment were not given to the patient.  Reviewed concept of anxiety as biochemical imbalance of neurotransmitters and rationale for treatment.  Instructed patient to contact office or on-call physician promptly should condition worsen or any new symptoms appear and provided on-call telephone numbers. IF THE PATIENT HAS ANY SUICIDAL OR HOMICIDAL IDEATIONS, CALL THE OFFICE, DISCUSS WITH A SUPPORT MEMBER, OR GO TO THE ER IMMEDIATELY. Patient was agreeable with this plan.  Follow up: 4 weeks.  Spent 15 minutes (>50% of visit) discussing the risks of anxiety disorder and major depressive disorder, the  pathophysiology, etiology, risks, and principles of treatment.     - sertraline (ZOLOFT) 100 MG tablet; Take 1 tablet (100 mg total) by mouth daily.  Dispense: 30 tablet; Refill: 11  - baclofen (LIORESAL) 10 MG tablet; Take 1 tablet (10 mg total) by mouth 3 (three) times a day.  Dispense: 30 tablet; Refill: 1  - buPROPion  (WELLBUTRIN XL) 300 MG 24 hr tablet; Take 1 tablet (300 mg total) by mouth every morning.  Dispense: 30 tablet; Refill: 2  - AMB REFERRAL TO MENTAL HEALTH AND ADDICTION  - Adult (18+); Outpatient Treatment; Individual/Couples/Family/Group Therapy/Health Psychology; [facility] [phone]; We will contact you to schedule the appointment or ple...     Next follow up:  No follow-ups on file.    Immunization Review  Adult Imm Review: No immunizations due today    I discussed the following with the patient:   Adult Healthy Living: Importance of regular exercise  Healthy nutrition  Getting adequate sleep  Stress management  Use of seat belts  Distracted driving  Helmets  Sporting equipment safety  STI prevention  Supplement use  Herbal medications/alternative medical therapies    I have had an Advance Directives discussion with the patient.    Subjective:   Chief Complaint: Reji Powell is an 24 y.o. male here for a preventative health visit.     HPI:  Patient reports that his main concern is to refill some of his medication. He reports history of exercise-induced asthma, burping, anxiety and depression.  Patient takes baclofen for gout pain and he has not used it for a while and his blood pain has been stable.  Patient takes Wellbutrin and sertraline for age anxiety and depression.  Patient stated that he still continues to worry a lot and anxious about a lot of things and wants time he is also depressed.  He believes that the medication is working but not as effective as he supposed to be.  Patient denied any suicidal homicidal ideation.  Patient denied chest pain, shortness of breath, syncope, fever and chills.    Healthy Habits  Are you taking a daily aspirin? No  Do you typically exercising at least 40 min, 3-4 times per week?  NO  Do you usually eat at least 4 servings of fruit and vegetables a day, include whole grains and fiber and avoid regularly eating high fat foods? NO  Have you  "had an eye exam in the past two years? Yes  Do you see a dentist twice per year? NO  Do you have any concerns regarding sleep? No    Safety Screen  If you own firearms, are they secured in a locked gun cabinet or with trigger locks? The patient does not own any firearms  No data recorded    Review of Systems:  Please see above.  The rest of the review of systems are negative for all systems.     Cancer Screening     Patient has no health maintenance due at this time          Patient Care Team:  Gogo Ervin FNP as PCP - General (Nurse Practitioner)        History     Not marked as reviewed during this visit.            Objective:   Vital Signs:   Visit Vitals  /76   Pulse 78   Ht 5' 10\" (1.778 m)   Wt (!) 224 lb 8 oz (101.8 kg)   SpO2 96%   BMI 32.21 kg/m           PHYSICAL EXAM  /76   Pulse 78   Ht 5' 10\" (1.778 m)   Wt (!) 224 lb 8 oz (101.8 kg)   SpO2 96%   BMI 32.21 kg/m    General appearance: alert, appears stated age and cooperative  Head: Normocephalic, without obvious abnormality, atraumatic  Eyes: conjunctivae/corneas clear. PERRL, EOM's intact. Fundi benign.  Ears: normal TM's and external ear canals both ears  Throat: lips, mucosa, and tongue normal; teeth and gums normal  Neck: no adenopathy, no carotid bruit, no JVD, supple, symmetrical, trachea midline and thyroid not enlarged, symmetric, no tenderness/mass/nodules  Back: symmetric, no curvature. ROM normal. No CVA tenderness.  Lungs: clear to auscultation bilaterally  Chest wall: no tenderness  Heart: regular rate and rhythm, S1, S2 normal, no murmur, click, rub or gallop  Abdomen: soft, non-tender; bowel sounds normal; no masses,  no organomegaly  Male genitalia: normal, deferred  Rectal: deferred  Extremities: extremities normal, atraumatic, no cyanosis or edema  Pulses: 2+ and symmetric  Skin: Skin color, texture, turgor normal. No rashes or lesions  Lymph nodes: Cervical, supraclavicular, and axillary nodes " normal.  Neurologic: Grossly normal             Medication List          Accurate as of March 11, 2020 10:37 AM. If you have any questions, ask your nurse or doctor.            CHANGE how you take these medications    * buPROPion 150 MG 12 hr tablet  Also known as:  WELLBUTRIN SR  INSTRUCTIONS:  Take 150 mg by mouth 2 (two) times a day.  What changed:  Another medication with the same name was added. Make sure you understand how and when to take each.  Changed by:  PEYTON Donis        * buPROPion 300 MG 24 hr tablet  Also known as:  Wellbutrin XL  INSTRUCTIONS:  Take 1 tablet (300 mg total) by mouth every morning.  What changed:  You were already taking a medication with the same name, and this prescription was added. Make sure you understand how and when to take each.  Changed by:  PEYTON Donis            * This list has 2 medication(s) that are the same as other medications prescribed for you. Read the directions carefully, and ask your doctor or other care provider to review them with you.            CONTINUE taking these medications    baclofen 10 MG tablet  Also known as:  LIORESAL  INSTRUCTIONS:  Take 1 tablet (10 mg total) by mouth 3 (three) times a day.        sertraline 100 MG tablet  Also known as:  ZOLOFT  INSTRUCTIONS:  Take 1 tablet (100 mg total) by mouth daily.              Where to Get Your Medications      These medications were sent to [id number] - [place] [address] AT [place] [address] [place] [address]    Phone:  [phone]     baclofen 10 MG tablet    buPROPion 300 MG 24 hr tablet    sertraline 100 MG tablet         Additional Screenings Completed Today:

## 2021-07-06 VITALS
SYSTOLIC BLOOD PRESSURE: 118 MMHG | HEART RATE: 68 BPM | WEIGHT: 207 LBS | BODY MASS INDEX: 29.7 KG/M2 | DIASTOLIC BLOOD PRESSURE: 70 MMHG

## 2022-04-14 ENCOUNTER — OFFICE VISIT (OUTPATIENT)
Dept: FAMILY MEDICINE | Facility: CLINIC | Age: 27
End: 2022-04-14
Payer: COMMERCIAL

## 2022-04-14 VITALS
BODY MASS INDEX: 31.95 KG/M2 | SYSTOLIC BLOOD PRESSURE: 118 MMHG | WEIGHT: 222.7 LBS | DIASTOLIC BLOOD PRESSURE: 64 MMHG | HEART RATE: 74 BPM

## 2022-04-14 DIAGNOSIS — F33.2 SEVERE EPISODE OF RECURRENT MAJOR DEPRESSIVE DISORDER, WITHOUT PSYCHOTIC FEATURES (H): Primary | ICD-10-CM

## 2022-04-14 DIAGNOSIS — F41.1 GENERALIZED ANXIETY DISORDER: ICD-10-CM

## 2022-04-14 PROCEDURE — 96127 BRIEF EMOTIONAL/BEHAV ASSMT: CPT | Mod: 59 | Performed by: FAMILY MEDICINE

## 2022-04-14 PROCEDURE — 99214 OFFICE O/P EST MOD 30 MIN: CPT | Performed by: FAMILY MEDICINE

## 2022-04-14 RX ORDER — BUPROPION HYDROCHLORIDE 300 MG/1
TABLET ORAL
COMMUNITY
Start: 2021-07-26 | End: 2022-04-14

## 2022-04-14 RX ORDER — SERTRALINE HYDROCHLORIDE 100 MG/1
100 TABLET, FILM COATED ORAL
COMMUNITY
Start: 2021-04-12 | End: 2022-04-14

## 2022-04-14 RX ORDER — SERTRALINE HYDROCHLORIDE 100 MG/1
100 TABLET, FILM COATED ORAL DAILY
Qty: 30 TABLET | Refills: 0 | Status: SHIPPED | OUTPATIENT
Start: 2022-04-14 | End: 2022-05-19

## 2022-04-14 ASSESSMENT — ANXIETY QUESTIONNAIRES
GAD7 TOTAL SCORE: 16
3. WORRYING TOO MUCH ABOUT DIFFERENT THINGS: MORE THAN HALF THE DAYS
4. TROUBLE RELAXING: MORE THAN HALF THE DAYS
2. NOT BEING ABLE TO STOP OR CONTROL WORRYING: MORE THAN HALF THE DAYS
6. BECOMING EASILY ANNOYED OR IRRITABLE: NEARLY EVERY DAY
GAD7 TOTAL SCORE: 16
GAD7 TOTAL SCORE: 16
7. FEELING AFRAID AS IF SOMETHING AWFUL MIGHT HAPPEN: MORE THAN HALF THE DAYS
7. FEELING AFRAID AS IF SOMETHING AWFUL MIGHT HAPPEN: MORE THAN HALF THE DAYS
5. BEING SO RESTLESS THAT IT IS HARD TO SIT STILL: MORE THAN HALF THE DAYS
1. FEELING NERVOUS, ANXIOUS, OR ON EDGE: NEARLY EVERY DAY

## 2022-04-14 ASSESSMENT — ENCOUNTER SYMPTOMS
CONSTITUTIONAL NEGATIVE: 1
LIGHT-HEADEDNESS: 0
SHORTNESS OF BREATH: 0
PALPITATIONS: 1

## 2022-04-14 ASSESSMENT — PATIENT HEALTH QUESTIONNAIRE - PHQ9
SUM OF ALL RESPONSES TO PHQ QUESTIONS 1-9: 20
SUM OF ALL RESPONSES TO PHQ QUESTIONS 1-9: 20
10. IF YOU CHECKED OFF ANY PROBLEMS, HOW DIFFICULT HAVE THESE PROBLEMS MADE IT FOR YOU TO DO YOUR WORK, TAKE CARE OF THINGS AT HOME, OR GET ALONG WITH OTHER PEOPLE: VERY DIFFICULT

## 2022-04-14 NOTE — PROGRESS NOTES
Assessment & Plan     Reji was seen today for refill request.    Diagnoses and all orders for this visit:    Severe episode of recurrent major depressive disorder, without psychotic features (H).  Patient has been off his medications for approximately 1-2 months.  He restarted Zoloft approximately 1 week ago, but he is uncertain if his medication is .  Some improvement noted since restarting Zoloft.  Patient did not achieve treatment goals with Zoloft 100 mg daily and Wellbutrin  mg daily previously.  Patient is interested in following up with Psychiatry for medication management at this time.  No current safety concerns or suicide plan noted.    -     Adult Mental [facility]  Referral; Future  -     sertraline (ZOLOFT) 100 MG tablet; Take 1 tablet (100 mg) by mouth daily    Generalized anxiety disorder.  See above.  -     Adult Mental [facility]  Referral; Future  -     sertraline (ZOLOFT) 100 MG tablet; Take 1 tablet (100 mg) by mouth daily    Discussed risks and benefits of treatment strategies.    -Patient was given a 1 month refill of his Zoloft.  -Discussed avoiding vaping and marijuana.  -Will hold off on further Wellbutrin XL treatment, pending the Psychiatry consultation.  -Effexor was discussed as an alternative depression/anxiety treatment option.    Patient is in agreement with the following plan:    Patient Instructions     Continue Zoloft 100 mg daily.    Follow-up/consult with Psychiatry (recommended in 1-2 weeks), as discussed.    Preventive/Reestablish care visit in ~2 weeks, as recommended.    Follow-up sooner if worsening symptoms.    Follow up in the ER/call 911 if you develop suicidal thought or other emergent symptoms.      Return for Follow up, as noted in Patient Instructions.    Miranda Wilson MD  [facility]    Diya Mendoza is a 26 year old male who presents to clinic today for the following health issues:  Chief  "Complaint   Patient presents with     Refill Request     Refill of depression and anxiety medications     History of Present Illness       Mental Health Follow-up:  Patient presents to follow-up on Depression & Anxiety.Patient's depression since last visit has been:  Medium  The patient is having other symptoms associated with depression.  Patient's anxiety since last visit has been:  Bad  The patient is having other symptoms associated with anxiety.  Any significant life events: No  Patient is feeling anxious or having panic attacks.  Patient has no concerns about alcohol or drug use.       Today's PHQ-9         PHQ-9 Total Score: 20  PHQ-9 Q9 Thoughts of better off dead/self-harm past 2 weeks :   (P) Several days  Thoughts of suicide or self harm: (P) No  Self-harm Plan:     Self-harm Action:       Safety concerns for self or others: (P) No    How difficult have these problems made it for you to do your work, take care of things at home, or get along with other people: Very difficult    Today's AUDREY-7 Score: 16    He eats 0-1 servings of fruits and vegetables daily.He consumes 1 sweetened beverage(s) daily.He exercises with enough effort to increase his heart rate 20 to 29 minutes per day.  He exercises with enough effort to increase his heart rate 4 days per week. He is missing 1 dose(s) of medications per week.    Depression and Anxiety Follow-Up    The patient has a known history of recurrent major depression and anxiety, previously treated with Zoloft 100 mg daily and Wellbutrin  mg daily.  Patient stopped taking his medications 1 to 2 months ago.  He restarted Zoloft 100 mg daily approximately 1 week ago, but he is uncertain if his medication is .  Some improvement noted since restarting Zoloft.  Mild queasiness related to restarting Zoloft, but no other side effects reported.  PHQ-9 and AUDREY-7 were reviewed, as noted below.  Patient states he intermittently thinks \"what's the point\" or feels like " "he's \"looking into the void\", as noted up to twice per week.  No current suicidal ideation.  No history of suicide plan or previous attempts.    Patient describes his anxiety as feeling \"easily overwhelmed\" in multiple situations, as first noted in high school.  Patient states that he was on treatment for anxiety in high school and college, but he does not recall being on medications other than Zoloft and Wellbutrin XL.  Patient has been in counseling previously.  He does not recall being seen by Psychiatry in the past.    Patient states he felt better while he was taking Zoloft and Wellbutrin XL, but he still did not \"feel good\".  PHQ-9 score did not decrease below 11, per chart review.  Patient would be interested in discussing other treatment options at this time.      Patient has continued vaping, which did not respond to previous Wellbutrin XL treatment.  Patient still uses marijuana intermittently, but he denies significant alcohol or other drug use.    Works at [employer].  Recent social stressors, including upcoming move and change in insurance.    Social History     Tobacco Use     Smoking status: Former Smoker     Smokeless tobacco: Current User     Tobacco comment: Vaping   Substance Use Topics     Alcohol use: Not Currently     Comment: 1 drink/month     Drug use: Yes     Frequency: 7.0 times per week     Types: Marijuana     PHQ 4/8/2020 4/12/2021 4/14/2022   PHQ-9 Total Score 12 8 20   Q9: Thoughts of better off dead/self-harm past 2 weeks Not at all Not at all Several days   F/U: Thoughts of suicide or self-harm - - No   F/U: Safety concerns - - No   Some encounter information is confidential and restricted. Go to Review [employer] activity to see all data.     AUDREY-7 SCORE 3/11/2020 4/12/2021 4/14/2022   Total Score - - 16 (severe anxiety)   Total Score 16 11 16   Some encounter information is confidential and restricted. Go to Review Flowsheets activity to see all data.     Follow Up Actions " "Taken  Discussed at time of visit.  No current safety concerns or suicide plan.  Crisis resource information provided in the After Visit Summary  Mental Health Referral placed    Review of Systems   Constitutional: Negative.    Respiratory: Negative for shortness of breath.    Cardiovascular: Positive for chest pain (Intermittent with stress (not currently), not related to exercise.) and palpitations (Occasional with stress (not currently), not related to exercise.).   Skin: Negative.    Neurological: Negative for syncope and light-headedness.         Objective    /64 (BP Location: Right arm, Patient Position: Sitting, Cuff Size: Adult Large)   Pulse 74   Wt 101 kg (222 lb 11.2 oz)   BMI 31.95 kg/m    Physical Exam   GENERAL APPEARANCE:  Awake, alert, and in no acute distress.  PSYCHIATRIC:  Pleasant affect.  Makes good eye contact.  Judgment and insight intact.  PHQ-9 and AUDREY-7 reviewed, as noted above.  Intermittent suicidal ideation twice/week (Question #9 of the PHQ-9), described as thinking \"what's the point\" or like he's \"looking into the void\".  No current suicidal ideation.  No history of suicide plan or previous attempts.  No homicidal ideation or lillian.  HEENT:  Sclera anicteric.  No conjunctivitis.    NECK:  Spontaneous full range of motion.  No lymphadenopathy, thyromegaly, or mass.  HEART:  Normal S1, S2.  Regular rate and rhythm.  No murmurs, rubs, or gallops.  LUNGS:  No respiratory distress.  No wheezes, rales, or rhonchi.  EXTREMITIES:  Moves 4 extremities.     NEUROLOGIC:  Gait within normal limits.      EKG:  Discussed with and declined by patient.    TSH:  Normal (0.67) on 3/11/2020.               "

## 2022-04-14 NOTE — PATIENT INSTRUCTIONS
Continue Zoloft 100 mg daily.  Follow-up/consult with Psychiatry (recommended in 1-2 weeks), as discussed.  Preventive/Reestablish care visit in ~2 weeks, as recommended.  Follow-up sooner if worsening symptoms.  Follow up in the ER/call 911 if you develop suicidal thought or other emergent symptoms.

## 2022-04-15 ASSESSMENT — PATIENT HEALTH QUESTIONNAIRE - PHQ9: SUM OF ALL RESPONSES TO PHQ QUESTIONS 1-9: 20

## 2022-04-15 ASSESSMENT — ANXIETY QUESTIONNAIRES: GAD7 TOTAL SCORE: 16

## 2022-05-19 ENCOUNTER — VIRTUAL VISIT (OUTPATIENT)
Dept: BEHAVIORAL HEALTH | Facility: CLINIC | Age: 27
End: 2022-05-19
Payer: COMMERCIAL

## 2022-05-19 ENCOUNTER — VIRTUAL VISIT (OUTPATIENT)
Dept: PSYCHIATRY | Facility: CLINIC | Age: 27
End: 2022-05-19
Attending: FAMILY MEDICINE
Payer: COMMERCIAL

## 2022-05-19 VITALS — WEIGHT: 222 LBS | BODY MASS INDEX: 31.85 KG/M2

## 2022-05-19 DIAGNOSIS — F41.1 GAD (GENERALIZED ANXIETY DISORDER): ICD-10-CM

## 2022-05-19 DIAGNOSIS — F12.10 CANNABIS ABUSE: ICD-10-CM

## 2022-05-19 DIAGNOSIS — F33.1 MAJOR DEPRESSIVE DISORDER, RECURRENT, MODERATE (H): Primary | ICD-10-CM

## 2022-05-19 DIAGNOSIS — F41.1 GENERALIZED ANXIETY DISORDER: ICD-10-CM

## 2022-05-19 DIAGNOSIS — F34.1 DYSTHYMIA: Primary | ICD-10-CM

## 2022-05-19 PROCEDURE — 90791 PSYCH DIAGNOSTIC EVALUATION: CPT | Mod: 95 | Performed by: SOCIAL WORKER

## 2022-05-19 PROCEDURE — 99205 OFFICE O/P NEW HI 60 MIN: CPT | Mod: 95 | Performed by: PSYCHIATRY & NEUROLOGY

## 2022-05-19 PROCEDURE — 99417 PROLNG OP E/M EACH 15 MIN: CPT | Mod: 95 | Performed by: PSYCHIATRY & NEUROLOGY

## 2022-05-19 RX ORDER — SERTRALINE HYDROCHLORIDE 100 MG/1
100 TABLET, FILM COATED ORAL DAILY
Qty: 90 TABLET | Refills: 1 | Status: SHIPPED | OUTPATIENT
Start: 2022-05-19

## 2022-05-19 ASSESSMENT — PATIENT HEALTH QUESTIONNAIRE - PHQ9
SUM OF ALL RESPONSES TO PHQ QUESTIONS 1-9: 15
SUM OF ALL RESPONSES TO PHQ QUESTIONS 1-9: 15
10. IF YOU CHECKED OFF ANY PROBLEMS, HOW DIFFICULT HAVE THESE PROBLEMS MADE IT FOR YOU TO DO YOUR WORK, TAKE CARE OF THINGS AT HOME, OR GET ALONG WITH OTHER PEOPLE: VERY DIFFICULT
SUM OF ALL RESPONSES TO PHQ QUESTIONS 1-9: 15
10. IF YOU CHECKED OFF ANY PROBLEMS, HOW DIFFICULT HAVE THESE PROBLEMS MADE IT FOR YOU TO DO YOUR WORK, TAKE CARE OF THINGS AT HOME, OR GET ALONG WITH OTHER PEOPLE: VERY DIFFICULT
SUM OF ALL RESPONSES TO PHQ QUESTIONS 1-9: 15

## 2022-05-19 ASSESSMENT — ANXIETY QUESTIONNAIRES
7. FEELING AFRAID AS IF SOMETHING AWFUL MIGHT HAPPEN: SEVERAL DAYS
GAD7 TOTAL SCORE: 14
GAD7 TOTAL SCORE: 14
6. BECOMING EASILY ANNOYED OR IRRITABLE: MORE THAN HALF THE DAYS
GAD7 TOTAL SCORE: 14
7. FEELING AFRAID AS IF SOMETHING AWFUL MIGHT HAPPEN: SEVERAL DAYS
3. WORRYING TOO MUCH ABOUT DIFFERENT THINGS: SEVERAL DAYS
1. FEELING NERVOUS, ANXIOUS, OR ON EDGE: NEARLY EVERY DAY
2. NOT BEING ABLE TO STOP OR CONTROL WORRYING: MORE THAN HALF THE DAYS
5. BEING SO RESTLESS THAT IT IS HARD TO SIT STILL: NEARLY EVERY DAY
8. IF YOU CHECKED OFF ANY PROBLEMS, HOW DIFFICULT HAVE THESE MADE IT FOR YOU TO DO YOUR WORK, TAKE CARE OF THINGS AT HOME, OR GET ALONG WITH OTHER PEOPLE?: VERY DIFFICULT
4. TROUBLE RELAXING: MORE THAN HALF THE DAYS

## 2022-05-19 ASSESSMENT — COLUMBIA-SUICIDE SEVERITY RATING SCALE - C-SSRS
REASONS FOR IDEATION PAST MONTH: DOES NOT APPLY
1. HAVE YOU WISHED YOU WERE DEAD OR WISHED YOU COULD GO TO SLEEP AND NOT WAKE UP?: YES
ATTEMPT LIFETIME: NO
2. HAVE YOU ACTUALLY HAD ANY THOUGHTS OF KILLING YOURSELF?: NO
6. HAVE YOU EVER DONE ANYTHING, STARTED TO DO ANYTHING, OR PREPARED TO DO ANYTHING TO END YOUR LIFE?: NO
1. IN THE PAST MONTH, HAVE YOU WISHED YOU WERE DEAD OR WISHED YOU COULD GO TO SLEEP AND NOT WAKE UP?: NO
REASONS FOR IDEATION LIFETIME: MOSTLY TO END OR STOP THE PAIN (YOU COULDN'T GO ON LIVING WITH THE PAIN OR HOW YOU WERE FEELING)
TOTAL  NUMBER OF ABORTED OR SELF INTERRUPTED ATTEMPTS LIFETIME: NO
TOTAL  NUMBER OF INTERRUPTED ATTEMPTS LIFETIME: NO

## 2022-05-19 NOTE — Clinical Note
Jorge Jean,  I met with Reji today.  I know you have not known him for long.  We agreed to continue his sertraline at his current dose, and will need a few more times to optimize his symptom control.  He is marijuana use is somewhat concerning, he is open to cutting back or discontinuing.  Please feel free to contact me with questions or concerns.  Thank you for the opportunity to be involved in the care of this patient.  Regards, Lauryn Brunner MD [facility] Psychiatry [facility]

## 2022-05-19 NOTE — PATIENT INSTRUCTIONS
Continue sertraline 100 mg daily.    Cut down on marijuana use.    Continue all other medications per your primary care provider.    Schedule an appointment with me in 4 weeks or sooner as needed.  You may call [facility] at [phone] to schedule.    [facility] Resources:    Go to the Emergency Department as needed or call after hours crisis line at [phone].    To schedule individual or family therapy, call [facility] at [phone].   Follow up with primary care provider as planned or sooner for acute medical concerns.  Call the psychiatric nurse line with medication questions or concerns at [phone].  [employer] may be used to communicate with your provider, but this is not intended to be used for emergencies.    Community Resources:    National Suicide Prevention Lifeline: 803.770.8211 (TTY: 946.721.6949). Call anytime for help.  (www.suicidepreventionlifeline.org)  National [place] [employer] Mental Illness (www.biju.org): 761.842.4406 or 933-267-3395.   Mental Health Association (www.mentalhealth.org): 814.967.5995 or 271-921-1659.  [place] Crisis Text Line: Text [place] to 091129  Suicide LifeLine Chat: suicidepreventionlifeline.org/chat

## 2022-05-19 NOTE — PROGRESS NOTES
"    Collaborative Care Psychiatry Service  Provider Name:  Rebecca Max     Credentials:  [facility], [facility]    PATIENT'S NAME: Reji Powell  PREFERRED NAME: Reji  PRONOUNS:       MRN: [id number]  : 1995  ADDRESS: [address] [place] [id number]  ACCT. NUMBER:  [id number]  DATE OF SERVICE: 22  START TIME: 9:56am  END TIME: 10:30am  PREFERRED PHONE: [phone]  May we leave a program related message: Yes  SERVICE MODALITY:  Video Visit:      Provider verified identity through the following two step process.  Patient provided:  Patient     Telemedicine Visit: The patient's condition can be safely assessed and treated via synchronous audio and visual telemedicine encounter.      Reason for Telemedicine Visit: Services only offered telehealth    Originating Site (Patient Location): Patient's home    Distant Site (Provider Location): Provider Remote Setting- Home Office    Consent:  The patient/guardian has verbally consented to: the potential risks and benefits of telemedicine (video visit) versus in person care; bill my insurance or make self-payment for services provided; and responsibility for payment of non-covered services.     Patient would like the video invitation sent by:  Text to cell phone: [phone]    Mode of Communication:  Video Conference via ClearApp    As the provider I attest to compliance with applicable laws and regulations related to telemedicine.    UNIVERSAL ADULT Mental Health DIAGNOSTIC ASSESSMENT    Identifying Information:  Patient is a 27 year old, .  The pronoun use throughout this assessment reflects the patient's chosen pronoun.  Patient was referred for an assessment by primary care clinic.  Patient attended the session alone.    Chief Complaint:   The reason for seeking services at this time is: \"Depression/Anxiety, Medication\". Patient reported that he had been taking anti-depressants consistently since 2016 until recently he ran out of the medications " "and noticed an increase in symptoms. At that time, he connected with his PCP and resumed the medications, which he has been taking for about a month. Patient was referred to [facility] for clarity on his medications and if he is on the right ones. Patient reported that his symptoms of depression and anxiety began in high school. He had been on and off medications between high school and 2016 where he started taking them consistently. Patient's parents did divorce in 2014 and then patient's father passed away in 2018. Patient did try some virtual therapy for a few months at the beginning of Covid, which he felt helped. Patient does not see a therapist currently. Patient also has always wondered about ADHD.    The problem(s) began 5/19/2016.    Patient has attempted to resolve these concerns in the past through medication and therapy.    Social/Family History:  Patient reported they grew up in [place].  They were raised by biological parents.  Parents  /  in 2014. Patient's mother remarried in April 2022 and patient's father never remarried and passed away in 2018. Patient reported having a younger sister. Patient reported that their childhood was \"good\".  Patient described their current relationships with family of origin as \"pretty good\". Patient reported he gets along with his mother and sister, but feels \"distant\".     The patient describes their cultural background as .  Cultural influences and impact on patient's life structure, values, norms, and healthcare: None.  Contextual influences on patient's health include: death of father, Covid-19, educational challenges, recent move.    These factors will be addressed in the Preliminary Treatment plan. Patient identified their preferred language to be English. Patient reported they does not need the assistance of an  or other support involved in therapy.     Patient reported had no significant delays in developmental tasks.   " "Patient's highest education level was some college. Patient reported he did try to go back to a community college, but it \"didn't stick\". Patient reported feeling shame that he did not finish college.  Patient identified the following learning problems: none reported.  Modifications will not be used to assist communication in therapy. Patient reports they are  able to understand written materials.    Patient reported the following relationship history: none other than current relationship.  Patient's current relationship status is has a partner or significant other for 8 years.   Patient identified their sexual orientation as heterosexual.  Patient reported having no child(ester). Patient identified mother; friends as part of their support system.  Patient identified the quality of these relationships as fair.    Patient's current living/housing situation involves staying in own home/apartment.  The immediate members of family and household include girlfriend and they report that housing is stable.    Patient is currently employed fulltime.  Patient reports their finances are obtained through employment. Patient does identify finances as a current stressor.      Patient reported that they have not been involved with the legal system. Patient does not report being under probation/ parole/ jurisdiction. They are not under any current court jurisdiction. .    Patient's Strengths and Limitations:  Patient identified the following strengths or resources that will help them succeed in treatment: commitment to health and well being, friends / good social support, family support, insight, intelligence and positive work environment. Things that may interfere with the patient's success in treatment include: none identified.     Assessments:  The following assessments were completed by patient for this visit:  PHQ9:   PHQ-9 SCORE 3/11/2020 4/8/2020 4/13/2020 4/12/2021 4/14/2022 5/19/2022 5/19/2022   PHQ-9 Total Score MyChart - - " - - 20 (Severe depression) - 15 (Moderately severe depression)   PHQ-9 Total Score 18 12 14 8 20 15 15     GAD7:   AUDREY-7 SCORE 3/11/2020 4/13/2020 4/12/2021 4/14/2022 5/19/2022   Total Score - - - 16 (severe anxiety) 14 (moderate anxiety)   Total Score 16 18 11 16 14     CAGE-AID:   CAGE-AID Total Score 5/19/2022 5/19/2022   Total Score 3 3   Total Score MyChart - 3 (A total score of 2 or greater is considered clinically significant)     PROMIS 10-Global Health (only subscores and total score):   PROMIS-10 Scores Only 5/19/2022 5/19/2022   Global Mental Health Score 10 10   Global Physical Health Score 16 16   PROMIS TOTAL - SUBSCORES 26 26     [place] Suicide Severity Rating Scale (Lifetime/Recent)  [place] Suicide Severity Rating (Lifetime/Recent) 4/10/2020 5/19/2022   Wish to be Dead (Lifetime) No -   Non-Specific Active Suicidal Thoughts (Lifetime) No -   RETIRED: 1. Wish to be Dead (Recent) No -   RETIRED: 2. Non-Specific Active Suicidal Thoughts (Recent) No -   3. Active Suicidal Ideation with any Methods (Not Plan) Without Intent to Act (Lifetime) No -   RETIRED: 3. Active Suicidal Ideation with any Methods (Not Plan) Without Intent to Act (Recent) No -   RETIRE: 4. Active Suicidal Ideation with Some Intent to Act, Without Specific Plan (Lifetime) No -   4. Active Suicidal Ideation with Some Intent to Act, Without Specific Plan (Recent) No -   RETIRE: 5. Active Suicidal Ideation with Specific Plan and Intent (Lifetime) No -   RETIRED: 5. Active Suicidal Ideation with Specific Plan and Intent (Recent) No -   1. Wish to be Dead (Lifetime) - 1   Wish to be Dead Description (Lifetime) - When patient was in high school, he had passive SI.   1. Wish to be Dead (Past 1 Month) - 0   2. Non-Specific Active Suicidal Thoughts (Lifetime) - 0   Most Severe Ideation Rating (Lifetime) - 1   Most Severe Ideation Rating (Past 1 Month) - (No Data)   Frequency (Lifetime) - 2   Frequency (Past 1 Month) - (No Data)   Duration  (Lifetime) - 2   Duration (Past 1 Month) - (No Data)   Controllability (Lifetime) - 2   Controllability (Past 1 Month) - (No Data)   Deterrents (Lifetime) - 1   Deterrents (Past 1 Month) - 0   Reasons for Ideation (Lifetime) - 4   Reasons for Ideation (Past 1 Month) - 0   Actual Attempt (Lifetime) - 0   Has subject engaged in non-suicidal self-injurious behavior? (Lifetime) - 0   Interrupted Attempts (Lifetime) - 0   Aborted or Self-Interrupted Attempt (Lifetime) - 0   Preparatory Acts or Behavior (Lifetime) - 0   Calculated C-SSRS Risk Score (Lifetime/Recent) - No Risk Indicated     Personal and Family Medical History:  Patient does not report a family history of mental health concerns.  Patient reports family history includes Alcoholism in his father; Depression in his maternal grandmother.    Patient does report Mental Health Diagnosis and/or Treatment.  Patient Patient reported the following previous diagnoses which include(s): anxiety and depression.  Patient reported symptoms began when patient was in high school.  Patient has received mental health services in the past: medication through PCP and some outpatient therapy.  Psychiatric Hospitalizations: none.  Patient denies a history of civil commitment.  Currently, patient is notreceiving other mental health services.  These include none.     Patient has had a physical exam to rule out medical causes for current symptoms.  Date of last physical exam was within the past year. Client was encouraged to follow up with PCP if symptoms were to develop. The patient has a [facility] Primary Care Provider, who is named No Ref-Primary, Physician..  Patient reports no current medical concerns.  Patient denies any issues with pain..   There are not significant appetite / nutritional concerns / weight changes.   Patient does not report a history of head injury / trauma / cognitive impairment.      Patient reports current meds as:   No outpatient medications have been marked  as taking for the 5/19/22 encounter (Virtual Visit) with Rebecca Max LICSW.       Medication Adherence:  Patient reports taking prescribed medications as prescribed.    Patient Allergies:    Allergies   Allergen Reactions     Cefixime Unknown       Medical History:    Past Medical History:   Diagnosis Date     Anxiety      Asthma      Burping      Depression      Depression      Current Mental Status Exam:   Appearance:  Appropriate    Eye Contact:  Good   Psychomotor:  Normal       Gait / station:  no problem  Attitude / Demeanor: Cooperative   Speech      Rate / Production: Normal/ Responsive      Volume:  Normal  volume      Language:  intact  Mood:   Depressed  Normal  Affect:   Appropriate    Thought Content: Clear   Thought Process: Flight of Ideas       Associations: No loosening of associations  Insight:   Good   Judgment:  Intact   Orientation:  All  Attention/concentration: Good    Substance Use:  Patient did report a family history of substance use concerns; see medical history section for details.  Patient has not received chemical dependency treatment in the past.  Patient has not ever been to detox.      Patient is not currently receiving any chemical dependency treatment.           Substance History of use Age of first use Date of last use     Pattern and duration of use (include amounts and frequency)   Alcohol currently use   19 4/30/2022 REPORTS SUBSTANCE USE: reports using substance 1 times per month and has 1-4 beverages at a time.   Patient reports heaviest use was NA.   Cannabis   currently use 19 5/18/2022 REPORTS SUBSTANCE USE: reports using substance 3-4 times per day and has 1 pinch at a time.   Patient reports heaviest use was over 5 years ago.     Amphetamines   never used     REPORTS SUBSTANCE USE: N/A   Cocaine/crack    never used       REPORTS SUBSTANCE USE: N/A   Hallucinogens used in the past   19  9/19/2016  REPORTS SUBSTANCE USE: N/A   Inhalants never used         REPORTS  SUBSTANCE USE: N/A   Heroin never used         REPORTS SUBSTANCE USE: N/A   Other Opiates never used     REPORTS SUBSTANCE USE: N/A   Benzodiazepine   never used     REPORTS SUBSTANCE USE: N/A   Barbiturates never used     REPORTS SUBSTANCE USE: N/A   Over the counter meds never used     REPORTS SUBSTANCE USE: N/A   Caffeine currently use 17   REPORTS SUBSTANCE USE: reports using substance 2-3 times per day and has 1 red bull or coffee at a time.   Patient reports heaviest use was NA.   Nicotine  currently use 19 5/19/2022 REPORTS SUBSTANCE USE: reports using substance multiple times per day and has 5 ml of juice in vape at a time.   Patient reports heaviest use was NA.   Other substances not listed above:  Identify:  never used     REPORTS SUBSTANCE USE: N/A     Patient reported the following problems as a result of their substance use: no problems, not applicable.    Substance Use: No symptoms    Based on the positive CAGE score and clinical interview there  are not indications of drug or alcohol abuse.      Significant Losses / Trauma / Abuse / Neglect Issues:   Patient did not serve in the .    There are indications or report of significant loss, trauma, abuse or neglect issues related to: parent's divorce in 2014 and death of father in 2018.    Concerns for possible neglect are not present.     Safety Assessment:   Patient denies current homicidal ideation and behaviors.  Patient denies current self-injurious ideation and behaviors.    Patient denied risk behaviors associated with substance use.  Patient denies any high risk behaviors associated with mental health symptoms.  Patient reports the following current concerns for their personal safety: None.  Patient reports there are not firearms in the house.     History of Safety Concerns:  Patient denied a history of homicidal ideation.     Patient denied a history of personal safety concerns.    Patient denied a history of assaultive behaviors.     Patient denied a history of sexual assault behaviors.     Patient denied a history of risk behaviors associated with substance use.  Patient denies any history of high risk behaviors associated with mental health symptoms.  Patient reports the following protective factors: sense of belonging; living with other people; effective problem solving skills; healthy fear of risky behaviors or pain    Risk Plan:  See Recommendations for Safety and Risk Management Plan    Review of Symptoms per patient report:  Depression: Lack of interest, Excessive or inappropriate guilt, Change in energy level, Difficulties concentrating, Feelings of hopelessness, Feelings of helplessness, Low self-worth, Ruminations, Irritability, Feeling sad, down, or depressed and Withdrawn  Vanesa:  No Symptoms  Psychosis: No Symptoms  Anxiety: Nervousness, Physical complaints, such as headaches, stomachaches, muscle tension, Social anxiety, Ruminations, Poor concentration and Irritability  Panic:  No symptoms  Post Traumatic Stress Disorder:  No Symptoms   Eating Disorder: No Symptoms  ADD / ADHD:  Inattentive, Difficulties listening, Poor task completion, Distractibility, Forgetful, Interrupts, Intrudes, Impulsive and Restlessness/fidgety  Conduct Disorder: No symptoms  Autism Spectrum Disorder: No symptoms  Obsessive Compulsive Disorder: No Symptoms    Patient reports the following compulsive behaviors and treatment history: No Symptoms.      Diagnostic Criteria:   Generalized Anxiety Disorder  A. Excessive anxiety and worry about a number of events or activities (such as work or school performance).   B. The person finds it difficult to control the worry.  C. Select 3 or more symptoms (required for diagnosis). Only one item is required in children.   - Restlessness or feeling keyed up or on edge.    - Being easily fatigued.    - Difficulty concentrating or mind going blank.    - Irritability.    - Muscle tension.   D. The focus of the anxiety and  worry is not confined to features of an Axis I disorder.  E. The anxiety, worry, or physical symptoms cause clinically significant distress or impairment in social, occupational, or other important areas of functioning.   F. The disturbance is not due to the direct physiological effects of a substance (e.g., a drug of abuse, a medication) or a general medical condition (e.g., hyperthyroidism) and does not occur exclusively during a Mood Disorder, a Psychotic Disorder, or a Pervasive Developmental Disorder.    - The aformentioned symptoms began many year(s) ago and occurs 7 days per week and is experienced as moderate. Major Depressive Disorder  CRITERIA (A-C) REPRESENT A MAJOR DEPRESSIVE EPISODE - SELECT THESE CRITERIA  A) Recurrent episode(s) - symptoms have been present during the same 2-week period and represent a change from previous functioning 5 or more symptoms (required for diagnosis)   - Depressed mood. Note: In children and adolescents, can be irritable mood.     - Increased sleep.    - Fatigue or loss of energy.    - Feelings of worthlessness or inappropriate and excessive guilt.    - Diminished ability to think or concentrate, or indecisiveness.   B) The symptoms cause clinically significant distress or impairment in social, occupational, or other important areas of functioning  C) The episode is not attributable to the physiological effects of a substance or to another medical condition  D) The occurence of major depressive episode is not better explained by other thought / psychotic disorders  E) There has never been a manic episode or hypomanic episode    Functional Status:  Patient reports the following functional impairments:  educational activities, health maintenance, management of the household and or completion of tasks, organization, relationship(s), self-care and social interactions.     Nonprogrammatic care:  Patient is requesting basic services to address current mental health  concerns.    Clinical Summary:  1. Reason for assessment: medication management and worsening symptoms.  2. Psychosocial, Cultural and Contextual Factors: death of father, Covid-19, educational challenges, recent move  .  3. Principal DSM5 Diagnoses  (Sustained by DSM5 Criteria Listed Above):   296.32 (F33.1) Major Depressive Disorder, Recurrent Episode, Moderate _  300.02 (F41.1) Generalized Anxiety Disorder.  4. Other Diagnoses that is relevant to services:   NA.  5. Provisional Diagnosis:  296.32 (F33.1) Major Depressive Disorder, Recurrent Episode, Moderate _  300.02 (F41.1) Generalized Anxiety Disorder as evidenced by symptoms present.  6. Prognosis: Expect Improvement.  7. Likely consequences of symptoms if not treated: worsening of symptoms.  8. Client strengths include:  caring, empathetic, employed, goal-focused, good listener, has a previous history of therapy, insightful, intelligent, motivated, open to learning, open to suggestions / feedback, support of family, friends and providers, supportive, wants to learn, willing to ask questions and willing to relate to others .     Recommendations:     1. Plan for Safety and Risk Management:   Recommended that patient call 911 or go to the local ED should there be a change in any of these risk factors..          Report to child / adult protection services was NA.     2. Patient's identified mental health concerns with a cultural influence will be addressed by agreed upon treatment plan.     3. Initial Treatment will focus on:    Depressed Mood - decrease in depressive symptoms  Anxiety - decrease in anxious symptoms.     4. Resources/Service Plan:    services are not indicated.   Modifications to assist communication are not indicated.   Additional disability accommodations are not indicated.      5. Collaboration:   Collaboration / coordination of treatment will be initiated with the following  support professionals: psychiatry.      6.   Referrals:   The following referral(s) will be initiated: none at this time. Next Scheduled Appointment: TBD.     A Release of Information has been obtained for the following: NA.    7. [facility]:    [facility]:  Discussed the general effects of drugs and alcohol on health and well-being. Provider gave patient printed information about the effects of chemical use on their health and well being. Recommendations:  Reduce cannabis and nicotine use.     8. Records:   These were not available for review at time of assessment.   Information in this assessment was obtained from the medical record and  provided by patient who is a good historian.    Patient will have open access to their mental health medical record.        Provider Name/ Credentials:  Rebecca Max, JOSEPH, [facility] [place]  May 19, 2022

## 2022-05-19 NOTE — PROGRESS NOTES
"Telemedicine Visit: The patient's condition can be safely assessed and treated via synchronous audio and visual telemedicine encounter.      Reason for Telemedicine Visit: Services only offered telehealth    Originating Site (Patient Location): Patient's home    Distant Site (Provider Location): Provider Remote Setting- Home Office    Consent:  The patient/guardian has verbally consented to: the potential risks and benefits of telemedicine (video visit) versus in person care; bill my insurance or make self-payment for services provided; and responsibility for payment of non-covered services.     Mode of Communication:  Video Conference via Testt    As the provider I attest to compliance with applicable laws and regulations related to telemedicine.      Reji is a 27 year old who is being evaluated via a billable video visit.      How would you like to obtain your AVS? MyChart  If the video visit is dropped, the invitation should be resent by: Text to cell phone: [phone]  Will anyone else be joining your video visit? No                                                              Outpatient Psychiatric Evaluation- Standard  Adult    Name:  Reji Powell  : 1995    Source of Referral:  Primary Care Provider: Miranda Wilson MD   Current Psychotherapist: None     Identifying Data:  Patient is a 27 year old  male  who presents for initial visit.  Patient attended the session alone. Patient prefers to be called Reji.    My Practice Policy was reviewed.     Chief Complaint:  The reason for seeking services at this time is: \"Depression/Anxiety, Medication\".     HPI:  [facility]  Patient reported that he had been taking anti-depressants consistently since 2016 until recently he ran out of the medications and noticed an increase in symptoms. At that time, he connected with his PCP and resumed the medications, which he has been taking for about a month. [facility] " [facility] and if he is on the right ones. Patient reported that his symptoms of depression and anxiety began in high school. He had been on and off medications between high school and 2016 where he started taking them consistently. Patient's parents did divorce in  and then patient's father passed away in 2018. Patient did try some virtual therapy for a few months at the beginning of Covid, which he felt helped. Patient does not see a therapist currently. Patient also has always wondered about ADHD.    Reji reports that he first had psychiatric symptoms in high school after a particularly difficult break-up.  He was seen by a psychiatrist and therapist at [facility] for about 6 months.  He took medication during that time, but then went to college and discontinued it.  He does not remember exactly what it was.    During his first year of college, his father called him one morning to tell him that his parents were going to split up.  Before that, he had not had any inkling that there was  trouble in their marriage.  After the first year of college, he went back home and lived with his father.  Unfortunately, his father increased his alcohol use, eventually lost his job, had to go to treatment, and  of cirrhosis in 2018.  Reji was actively involved in his care during that time.  After his father's death, he \"developed a burping issue.\"  He saw his primary care provider who started him on sertraline and bupropion for his mood and anxiety.  He also took baclofen for his burping.  He continued on his medication until a couple of months ago.  By that time, his primary care provider had left the clinic and he needed a refill.  He needed to reestablish care, so was off medications from about 2022 until middle of 2022.    Current stressors include a recent move.  Reji and his girlfriend have been living with his sister, but moved to a smaller unit " "and are no longer living with his sister.  He is about to come off his mom's insurance because he recently turned 27.  He is has been feeling more stressed and anxious since being off medications.    Psychiatric Review of Symptoms:  Depression: Reji rates his mood today as 5 or 6 out of 10.  He denies having intermittent episodes of low mood, but is more chronically depressed and has been for 2 years or more.  He reports low energy, feeling hopeless, helpless, worthless, and guilty, poor concentration, and decreased interest in his normal activities.  He denies suicidal thoughts or thoughts of death.  He \"feels stuck, and is not happy with his life.\"     Mood rating (1 to 10 with 10 being the best): 5 or 6   PHQ-9 scores:   PHQ-9 SCORE 4/14/2022 5/19/2022 5/19/2022   PHQ-9 Total Score MyChart 20 (Severe depression) - 15 (Moderately severe depression)   PHQ-9 Total Score 20 15 15   Some encounter information is confidential and restricted. Go to Review Flowsheets activity to see all data.       Vanesa: No history of manic episodes.   MDQ Score: Not completed    Anxiety: Reji reports being very fidgety and having ruminating thoughts and excessive anxiety.  He has some difficulty concentrating and is somewhat irritable.  He reports social anxiety that interferes somewhat with his ability to function.   AUDREY-7 scores:    AUDREY-7 SCORE 4/12/2021 4/14/2022 5/19/2022   Total Score - 16 (severe anxiety) 14 (moderate anxiety)   Total Score 11 16 14   Some encounter information is confidential and restricted. Go to Review [employer]heets activity to see all data.       Panic: No history of panic attacks.    PTSD: No history of life-threatening trauma.    OCD: No history of obsessive thoughts or compulsive behaviors.    Psychosis: No history of auditory or visual hallucinations, paranoid ideations, ideas of reference, or thought insertion or extraction.    Impulse control: No history of gambling, shoplifting, or violent " outbursts.    Eating Disorder: No history of binging, purging, or restricting calories.    Psychiatric History:   No previous psychiatric hospitalizations    No history of chemical dependency treatment    Suicide Risk Assessment:  Suicide Attempts: No   Self-injurious Behavior: He reports some cutting in high school.    Past Medical History:  Medical history:   Past Medical History:   Diagnosis Date     Anxiety      Asthma      Burping      Depression      Depression        Surgical history:   Past Surgical History:   Procedure Laterality Date     TONSILLECTOMY       UPPER GASTROINTESTINAL ENDOSCOPY       wisdom teeth         Trauma: He denies a history of head trauma or seizures    Review of Systems:  Increased burping, chest pain that resolved with restarting sertraline.    Current Medications:    Current Outpatient Medications:      sertraline (ZOLOFT) 100 MG tablet, Take 1 tablet (100 mg) by mouth daily, Disp: 90 tablet, Rfl: 1    Supplements: Reviewed per Electronic Medical Record Today    The [place] Prescription Monitoring Program has been reviewed and there are no concerns about diversionary activity for controlled substances at this time.  No data for controlled substances over the last one year.     Past medication trials include but are not limited to:   Sertraline, bupropion, unknown medication    Allergies:  Cefixime    Vital Signs:  Vitals: Wt 100.7 kg (222 lb)   BMI 31.85 kg/m      Labs:  Most recent laboratory results reviewed and the pertinent results include:   TSH   Date Value Ref Range Status   03/11/2020 0.67 0.30 - 5.00 uIU/mL Final   12/12/2016 1.14 0.40 - 4.00 mU/L Final       Most recent EKG none    Substance Use History:  He reports that he started using marijuana in 2015 and has used it on a daily basis since then.  He estimates that he uses it 3 times a day in small amounts.  Social History     Tobacco Use     Smoking status: Former Smoker     Smokeless tobacco: Current User      "Tobacco comment: Vaping   Substance Use Topics     Alcohol use: Not Currently     Comment: 1 drink/month     Drug use: Yes     Frequency: 7.0 times per week     Types: Marijuana      Family History:   Patient reported family history includes:   Family History   Problem Relation Age of Onset     Depression Maternal Grandmother      Diabetes No family hx of      Coronary Artery Disease No family hx of      Hypertension No family hx of      Alcoholism Father        Developmental History:  Not explored    Social History:   Per DA by Rebecca Max [facility]:  Patient reported they grew up in [place].  They were raised by biological parents.  Parents  /  in 2014. Patient's mother remarried in April 2022 and patient's father never remarried and passed away in 2018. Patient reported having a younger sister. Patient reported that their childhood was \"good\".  Patient described their current relationships with family of origin as \"pretty good\". Patient reported he gets along with his mother and sister, but feels \"distant\".     Patient reported had no significant delays in developmental tasks.   Patient's highest education level was some college. Patient reported he did try to go back to a community college, but it \"didn't stick\". Patient reported feeling shame that he did not finish college.      Patient's current relationship status is has a partner or significant other for 8 years.   Patient identified their sexual orientation as heterosexual.  Patient reported having no child(ester). Patient identified mother; friends as part of their support system.  Patient identified the quality of these relationships as fair.    Patient's current living/housing situation involves staying in own home/apartment.  The immediate members of family and household include girlfriend and they report that housing is stable.     Patient is currently employed fulltime.  Patient reports their finances are obtained through " employment. Patient does identify finances as a current stressor.       Patient reported that they have not been involved with the legal system. Patient does not report being under probation/ parole/ jurisdiction. They are not under any current court jurisdiction.     Mental Status Examination:     Reji is a 27-year-old man in no acute distress.  He is neatly groomed in work clothing.  Speech is clear and normal in rate and tone.  Eye contact is fair over the video connection.  Affect is full.  Mood is mildly dysphoric.  Thoughts are logical and spontaneous with no loose associations or flight of ideas.  Thought content shows no psychosis.  No suicidal thoughts.    Sensorium is clear.  He is oriented to person, place, and time.  Memory appears to be intact for immediate, recent, and remote events.  Intelligence is estimated to be average.  Abstractive ability appears to be intact.  Attention and concentration were good during this interview.  Personal insight is good.  Personal and impersonal judgment appear to be intact.      Assessment and Plan:  Reji is a 27-year-old man who reports having been treated for depression and high school after a difficult break-up.  He sought treatment again after his father's death in 2018, and has been on medications up until 2 months ago, when he ran out of prescriptions.  He uses cannabis on a regular basis.      ICD-10-CM    1. Dysthymia  F34.1 Adult Mental Health  Referral     sertraline (ZOLOFT) 100 MG tablet   2. Generalized anxiety disorder  F41.1 Adult Mental Health  Referral     sertraline (ZOLOFT) 100 MG tablet   3. Cannabis abuse  F12.10        Medical Comorbidities Include: See above    Patient Strengths:   Reji  identified the following strengths: commitment to health and well being, friends / good social support, family support, insight, intelligence and positive work environment .     Treatment Plan:  After discussing several options for moving  forward with treatment, we agreed to continue with sertraline 100 mg daily for another month, given that Reji has only been back on it for a months.  He has not had extended trials of sertraline without additional bupropion.  In addition to continuing with sertraline, he will attempt to cut back on marijuana use.    If future options include increasing sertraline up to a maximum dose of 200 mg a day, restarting bupropion, or switching to a different class of medications.  Certainly venlafaxine, Pristiq, or Cymbalta might be appropriate.    Reji is interested in becoming a nurse, and feels like a failure.  He was encouraged to look into options for pursuing further education.    Patient Instructions   Continue sertraline 100 mg daily.    Cut down on marijuana use.    Continue all other medications per your primary care provider.    Schedule an appointment with me in 4 weeks or sooner as needed.  You may call [facility] at [phone] to schedule.    [place] Resources:      Go to the Emergency Department as needed or call after hours crisis line at [phone].      To schedule individual or family therapy, call [facility] at [phone].     Follow up with primary care provider as planned or sooner for acute medical concerns.    Call the psychiatric nurse line with medication questions or concerns at [phone].    Digital Lumenst may be used to communicate with your provider, but this is not intended to be used for emergencies.    Community Resources:      National Suicide Prevention Lifeline: 430.599.1459 (TTY: 499.462.1894). Call anytime for help.  (www.suicidepreventionlifeline.org)    National Castle on Mental Illness (www.biju.org): 317.995.8343 or 744-646-1283.     Mental Health Association (www.mentalhealth.org): 628.834.8648 or 354-432-1516.    [place] Crisis Text Line: Text [place] to 572602    Suicide LifeLine Chat: suicidepreventionlifeline.org/chat       Patient  Status:  Patient will continue to be seen for ongoing consultation and stabilization.    Video call duration: 34 minutes  Start: 11:21 AM  Stop: 11:55 AM  Additional telephone call duration after video call failed: 28 minutes  Total time spent, including chart review and documentation: 92 minutes

## 2022-06-11 ENCOUNTER — HEALTH MAINTENANCE LETTER (OUTPATIENT)
Age: 27
End: 2022-06-11

## 2022-10-16 ENCOUNTER — HEALTH MAINTENANCE LETTER (OUTPATIENT)
Age: 27
End: 2022-10-16

## 2023-06-17 ENCOUNTER — HEALTH MAINTENANCE LETTER (OUTPATIENT)
Age: 28
End: 2023-06-17

## 2023-11-14 NOTE — PROGRESS NOTES
Reji Powell is a 25 y.o. male who is being evaluated via a billable video visit.      How would you like to obtain your AVS? MyChart.  If dropped from the video visit, the video invitation should be resent by: Text to cell phone: [phone]  Will anyone else be joining your video visit? No      Video Start Time: 4:10 PM    Assessment & Plan     Moderate episode of recurrent major depressive disorder (H)  Improved anxiety and depression.  Patient will continue with current prescriptions and follow-up in 6 months.  - buPROPion (WELLBUTRIN XL) 300 MG 24 hr tablet  Dispense: 90 tablet; Refill: 3  - sertraline (ZOLOFT) 100 MG tablet  Dispense: 90 tablet; Refill: 2      25 minutes spent on the date of the encounter doing chart review, patient visit and documentation        No follow-ups on file.    PEYTON Donis  [facility]   Reji Powell is 25 y.o. and presents today for the following health issues   HPI       Depression and Anxiety Follow-Up    How are you doing with your depression since your last visit?: Good    How are you doing with your anxiety since your last visit?: Good    Are you having other symptoms that might be associated with depression or anxiety?: No    Have you had a significant life event?: No     Do you have any concerns with your use of alcohol or other drugs?: No    Social History     Tobacco Use     Smoking status: Former Smoker     Smokeless tobacco: Current User     Tobacco comment: vaping   Substance Use Topics     Alcohol use: Yes     Alcohol/week: 1.0 standard drinks     Types: 1 Glasses of wine per week     Frequency: Monthly or less     Drug use: Yes     Frequency: 7.0 times per week     Types: Marijuana       PHQ-9 DEPRESSION SCALE 4/13/2020   Trouble falling or staying asleep, or sleeping too much Not at all   Feeling tired or having little energy Several days   Poor appetite or overeating Nearly every day   Feeling bad  about yourself - or that you are a failure or have let yourself or your family down More than half the days   Trouble concentrating on things, such as reading the newspaper or watching television Nearly every day   Moving or speaking so slowly that other people could have noticed. Or the opposite - being so fidgety or restless that you have been moving around a lot more than usual More than half the days   Thoughts that you would be better off dead, or of hurting yourself in some way Not at all   PHQ-9 Severity Score 14 (minor depression)   Some recent data might be hidden       AUDREY-7 Screening Results:  How difficult did these problems make it for you to do your work, take care of things at home or get along with other people? : Somewhat difficult (2021  1:00 PM)  Feeling nervous, anxious, or on edge: 1 (2021  1:00 PM)  Not being able to stop or control worryin (2021  1:00 PM)  Worrying too much about different things: 1 (2021  1:00 PM)  Trouble relaxing: 3 (2021  1:00 PM)  Being so restless that it's hard to sit still: 1 (2021  1:00 PM)  Becoming easily annoyed or irritable: 3 (2021  1:00 PM)  Feeling afraid as if something awful might happen: 1 (2021  1:00 PM)  AUDREY 7 Total Score: 11 (2021  1:00 PM)  How difficult did these problems make it for you to do your work, take care of things at home or get along with other people? : Somewhat difficult (2021  1:00 PM)    In the past two weeks have you had thoughts of suicide or self-harm?  No.    Do you have concerns about your personal safety or the safety of others?   No    Review of Systems  Review of Systems - General ROS: negative        Objective    Vitals - Patient Reported  Weight (Patient Reported): 210 lb (95.3 kg)    Physical Exam              Video-Visit Details    Type of service:  Video Visit    Video End Time (time video stopped): 4:25 PM  Originating Location (pt. Location): Home    Distant Location  (provider location):  [facility]     Platform used for Video Visit: Feli     yes

## 2024-08-10 ENCOUNTER — HEALTH MAINTENANCE LETTER (OUTPATIENT)
Age: 29
End: 2024-08-10